# Patient Record
Sex: MALE | Race: WHITE | NOT HISPANIC OR LATINO | Employment: OTHER | URBAN - METROPOLITAN AREA
[De-identification: names, ages, dates, MRNs, and addresses within clinical notes are randomized per-mention and may not be internally consistent; named-entity substitution may affect disease eponyms.]

---

## 2022-09-26 ENCOUNTER — OFFICE VISIT (OUTPATIENT)
Dept: ORTHOPEDIC SURGERY | Facility: CLINIC | Age: 62
End: 2022-09-26

## 2022-09-26 VITALS — WEIGHT: 207.23 LBS | HEIGHT: 65 IN | OXYGEN SATURATION: 98 % | HEART RATE: 66 BPM | BODY MASS INDEX: 34.53 KG/M2

## 2022-09-26 DIAGNOSIS — S92.354A CLOSED NONDISPLACED FRACTURE OF FIFTH METATARSAL BONE OF RIGHT FOOT, INITIAL ENCOUNTER: Primary | ICD-10-CM

## 2022-09-26 PROCEDURE — 99203 OFFICE O/P NEW LOW 30 MIN: CPT | Performed by: STUDENT IN AN ORGANIZED HEALTH CARE EDUCATION/TRAINING PROGRAM

## 2022-09-26 RX ORDER — DEXLANSOPRAZOLE 60 MG/1
60 CAPSULE, DELAYED RELEASE ORAL DAILY
COMMUNITY
End: 2023-03-28 | Stop reason: SDUPTHER

## 2022-09-26 RX ORDER — INSULIN ASPART 100 [IU]/ML
INJECTION, SUSPENSION SUBCUTANEOUS 2 TIMES DAILY WITH MEALS
COMMUNITY
End: 2023-03-28

## 2022-09-26 RX ORDER — ATORVASTATIN CALCIUM 20 MG/1
20 TABLET, FILM COATED ORAL DAILY
COMMUNITY
End: 2023-03-28 | Stop reason: SDUPTHER

## 2022-09-26 NOTE — PROGRESS NOTES
"Chief Complaint  Pain and Follow-up of the Right Foot    Subjective          Said Gris presents to [facility] for   History of Present Illness    The patient presents here today for evaluation of the right foot. The patient reports he injured his right foot on 9/19/22. He is here with his daughter. He had x-rays and was placed into a splint. He has no other complaints. His splint was removed today.   No Known Allergies     Social History     Socioeconomic History   • Marital status:    Tobacco Use   • Smoking status: Never Smoker   • Smokeless tobacco: Never Used        I reviewed the patient's chief complaint, history of present illness, review of systems, past medical history, surgical history, family history, social history, medications, and allergy list.     REVIEW OF SYSTEMS    Constitutional: Denies fevers, chills, weight loss  Cardiovascular: Denies chest pain, shortness of breath  Skin: Denies rashes, acute skin changes  Neurologic: Denies headache, loss of consciousness  MSK: Right foot pain      Objective   Vital Signs:   Pulse 66   Ht 165.1 cm (65\")   Wt 94 kg (207 lb 3.7 oz)   SpO2 98%   BMI 34.49 kg/m²     Body mass index is 34.49 kg/m².    Physical Exam    General: Alert. No acute distress.   Right foot- Moderate swelling. No wounds. Mild tenderness to anterior lateral ligaments. Tender to 5th metatarsal base. Non-tender elsewhere. Intact achilles. Calf soft. Neurovascularly intact. Positive EHL, FHL, GS and TA. Sensation intact to all 5 nerves of the foot. Positive pulses. Intact ankle plantar flexion and dorsiflexion     Procedures    Imaging Results (Most Recent)     None                   Assessment and Plan        No results found.     Diagnoses and all orders for this visit:    1. Closed nondisplaced fracture of fifth metatarsal bone of right foot, initial encounter (Primary)        Discussed the treatment plan with the patient.  The patient was placed " into a short CAM walker boot today. He can remove for hygiene. He can bear weight through the heel. I advised him to ice and elevate for swelling.       Will obtain X-Rays of Right foot at next visit.     Call or return if worsening symptoms.    Scribed for Christiano Martin MD by Vicki Alfaro  09/26/2022   14:12 EDT         Follow Up   Return in about 3 weeks (around 10/17/2022).  Patient was given instructions and counseling regarding his condition or for health maintenance advice. Please see specific information pulled into the AVS if appropriate.       I have personally performed the services described in this document as scribed by the above individual and it is both accurate and complete.     Christiano Martin MD  09/26/22  14:16 EDT

## 2022-10-19 ENCOUNTER — OFFICE VISIT (OUTPATIENT)
Dept: ORTHOPEDIC SURGERY | Facility: CLINIC | Age: 62
End: 2022-10-19

## 2022-10-19 VITALS — HEIGHT: 65 IN | WEIGHT: 207 LBS | BODY MASS INDEX: 34.49 KG/M2

## 2022-10-19 DIAGNOSIS — S92.354D CLOSED NONDISPLACED FRACTURE OF FIFTH METATARSAL BONE OF RIGHT FOOT WITH ROUTINE HEALING, SUBSEQUENT ENCOUNTER: Primary | ICD-10-CM

## 2022-10-19 PROCEDURE — 99213 OFFICE O/P EST LOW 20 MIN: CPT | Performed by: PHYSICIAN ASSISTANT

## 2022-10-19 NOTE — PROGRESS NOTES
"Chief Complaint  Follow-up of the Right Foot    Subjective          Said Gris presents to [facility] for   History of Present Illness    Said Gris presents today for a follow-up of his right foot.  Patient has a right fifth metatarsal fracture with initial injury 9/19/2022.  Today, patient states he is doing well.  He reports he is doing better than his previous appointment.  He reports no pain.  He experiences swelling at night sometimes.  He has remained in his fracture boot and reports minimal ambulation.  Denies new injuries.  Denies numbness or tingling.      No Known Allergies     Social History     Socioeconomic History   • Marital status:    Tobacco Use   • Smoking status: Never   • Smokeless tobacco: Never        I reviewed the patient's chief complaint, history of present illness, review of systems, past medical history, surgical history, family history, social history, medications, and allergy list.     REVIEW OF SYSTEMS    Constitutional: Denies fevers, chills, weight loss  Cardiovascular: Denies chest pain, shortness of breath  Skin: Denies rashes, acute skin changes  Neurologic: Denies headache, loss of consciousness  MSK: Right foot pain      Objective   Vital Signs:   Ht 165.1 cm (65\")   Wt 93.9 kg (207 lb)   BMI 34.45 kg/m²     Body mass index is 34.45 kg/m².    Physical Exam    General: Alert. No acute distress.   Right lower extremity: Calf soft, nontender.  Achilles intact.  No pain with syndesmotic squeeze test.  Nontender to the medial or lateral malleolus.  Nontender to the fifth metatarsal.  Nontender to the remaining foot.  Sensation intact to the dorsal and plantar foot.  Demonstrates active ankle plantarflexion and dorsiflexion.  Toe range of motion intact.  No swelling.  Palpable pedal pulses.    Procedures    Imaging Results (Most Recent)     Procedure Component Value Units Date/Time    XR Foot 3+ View Right [[id number]] Resulted: 10/19/22 1119 "     Updated: 10/19/22 1120    Narrative:      Indications: Follow-up right fifth metatarsal fracture    Views: AP, oblique, lateral right foot    Findings: Right fifth base metatarsal fracture is seen.  Fracture is well   aligned.  Joints appear anatomically aligned.  No additional fractures.    Comparative Data: No comparative data available.                   Assessment and Plan    Diagnoses and all orders for this visit:    1. Closed nondisplaced fracture of fifth metatarsal bone of right foot with routine healing, subsequent encounter (Primary)  -     XR Foot 3+ View Right        Sowmya Landry presents today for follow-up of his right fifth metatarsal fracture with initial injury 9/19/2022.  X-rays reviewed with the patient and family member today.  Patient instructed to continue with fracture boot for ambulation.  We discussed weightbearing as tolerated while in the fracture boot, bear weight through the heel.  Patient and family member expressed understanding.  Remove the boot for hygiene and to work on gentle range of motion exercises.  These exercises were demonstrated in the office today.  Use ice and elevation for inflammation as needed.  Patient will follow up in 3 weeks for reevaluation.  We will obtain x-rays of the right foot at next visit.    Call or return if symptoms worsen or patient has any concerns.   Will obtain X-Rays of right foot at next visit.         Follow Up   Return in about 3 weeks (around 11/9/2022).  Patient was given instructions and counseling regarding his condition or for health maintenance advice. Please see specific information pulled into the AVS if appropriate.     Maryellen Dowling PA-C  10/19/22  11:20 EDT

## 2022-11-08 NOTE — PROGRESS NOTES
"Chief Complaint  Follow-up of the Right Foot    Subjective          Said Gris presents to [facility]   History of Present Illness    Said Gris presents today for a follow-up of his right foot.  Patient has a right fifth metatarsal fracture with initial injury 9/18/2022 that we are treating conservatively.  Today, he states he is doing well.  He is to continue with his fracture boot the majority of time with ambulation.  Patient explains that he has ambulated without the fracture boot and does okay.  He reports no pain today.  He states that his swelling occurs on occasion but has improved.  He denies new injuries.  Denies numbness or tingling.      No Known Allergies     Social History     Socioeconomic History   • Marital status:    Tobacco Use   • Smoking status: Never   • Smokeless tobacco: Never        I reviewed the patient's chief complaint, history of present illness, review of systems, past medical history, surgical history, family history, social history, medications, and allergy list.     REVIEW OF SYSTEMS    Constitutional: Denies fevers, chills, weight loss  Cardiovascular: Denies chest pain, shortness of breath  Skin: Denies rashes, acute skin changes  Neurologic: Denies headache, loss of consciousness  MSK: Right foot pain      Objective   Vital Signs:   Ht 165.1 cm (65\")   Wt 93.9 kg (207 lb)   BMI 34.45 kg/m²     Body mass index is 34.45 kg/m².    Physical Exam    General: Alert. No acute distress.   Right lower extremity: Nontender to the fifth metatarsal region.  No other areas of tenderness to the foot.  Calf soft, nontender.  Achilles intact.  Nontender to the medial lateral malleolus.  Ankle stable ligamentous stress.  Demonstrates active ankle plantarflexion and dorsiflexion with no associated pain or stiffness.  Toe range of motion intact.  Sensation intact to the dorsal and plantar foot.  Palpable pedal pulses.    Procedures    Imaging Results (Most " Recent)     Procedure Component Value Units Date/Time    XR Foot 3+ View Right [[id number]] Resulted: 11/09/22 1420     Updated: 11/09/22 1421    Narrative:      Indications: Follow-up left fifth metatarsal fracture    Views: AP, oblique, lateral right foot    Findings: Right fifth metatarsal base fracture is seen.  Fracture   alignment is stable.  Slight increase in callus formation about the   fracture line.  All joints are well aligned.  No additional fractures.    Comparative Data: Comparative data found and reviewed today.                   Assessment and Plan    Diagnoses and all orders for this visit:    1. Closed nondisplaced fracture of fifth metatarsal bone of right foot with routine healing, subsequent encounter (Primary)  -     XR Foot 3+ View Right        Sowmya Landry presents today for follow-up of his right fifth metatarsal fracture that we are treating nonoperatively.  X-rays reviewed with the patient and family member today.  Patient was given a [place] shoe.  He is instructed to transition to the hard sole shoe as tolerated.  Continue ambulating with a hard soled shoe at all times.  Patient instructed to continue working on ankle and toe range of motion exercises.  Use ice and elevation as needed for inflammation.      Patient will follow up in 4 weeks for reevaluation.  We will obtain new x-rays of the right foot at next visit.      Call or return if symptoms worsen or patient has any concerns.       Follow Up   Return in about 4 weeks (around 12/7/2022).  Patient was given instructions and counseling regarding his condition or for health maintenance advice. Please see specific information pulled into the AVS if appropriate.     Maryellen Dowling PA-C  11/09/22  14:23 EST

## 2022-11-09 ENCOUNTER — OFFICE VISIT (OUTPATIENT)
Dept: ORTHOPEDIC SURGERY | Facility: CLINIC | Age: 62
End: 2022-11-09

## 2022-11-09 VITALS — HEIGHT: 65 IN | BODY MASS INDEX: 34.49 KG/M2 | WEIGHT: 207 LBS

## 2022-11-09 DIAGNOSIS — S92.354D CLOSED NONDISPLACED FRACTURE OF FIFTH METATARSAL BONE OF RIGHT FOOT WITH ROUTINE HEALING, SUBSEQUENT ENCOUNTER: Primary | ICD-10-CM

## 2022-11-09 PROCEDURE — 99213 OFFICE O/P EST LOW 20 MIN: CPT | Performed by: PHYSICIAN ASSISTANT

## 2022-12-14 ENCOUNTER — OFFICE VISIT (OUTPATIENT)
Dept: ORTHOPEDIC SURGERY | Facility: CLINIC | Age: 62
End: 2022-12-14

## 2022-12-14 VITALS — BODY MASS INDEX: 34.49 KG/M2 | HEIGHT: 65 IN | WEIGHT: 207 LBS

## 2022-12-14 DIAGNOSIS — S92.354D CLOSED NONDISPLACED FRACTURE OF FIFTH METATARSAL BONE OF RIGHT FOOT WITH ROUTINE HEALING, SUBSEQUENT ENCOUNTER: Primary | ICD-10-CM

## 2022-12-14 DIAGNOSIS — M79.671 RIGHT FOOT PAIN: ICD-10-CM

## 2022-12-14 PROCEDURE — 99213 OFFICE O/P EST LOW 20 MIN: CPT | Performed by: PHYSICIAN ASSISTANT

## 2022-12-14 RX ORDER — BISOPROLOL FUMARATE 5 MG/1
TABLET, FILM COATED ORAL
COMMUNITY
Start: 2022-11-08 | End: 2023-03-28 | Stop reason: SDUPTHER

## 2022-12-14 RX ORDER — SODIUM BICARBONATE 650 MG/1
TABLET ORAL
COMMUNITY
Start: 2022-11-07

## 2022-12-14 RX ORDER — INSULIN ASPART 100 [IU]/ML
INJECTION, SOLUTION INTRAVENOUS; SUBCUTANEOUS
COMMUNITY
Start: 2022-11-07 | End: 2023-03-28 | Stop reason: SDUPTHER

## 2022-12-14 RX ORDER — AMLODIPINE AND VALSARTAN 10; 160 MG/1; MG/1
TABLET ORAL
COMMUNITY
Start: 2022-11-07 | End: 2023-03-28 | Stop reason: SDUPTHER

## 2022-12-14 RX ORDER — ALLOPURINOL 300 MG/1
TABLET ORAL
COMMUNITY
Start: 2022-11-07 | End: 2023-03-28 | Stop reason: SDUPTHER

## 2022-12-14 NOTE — PROGRESS NOTES
"Chief Complaint  Follow-up of the Right Foot    Subjective          Said Gris presents to [facility]   History of Present Illness    Said Gris presents today for a follow-up of his right foot.  Patient is a right fifth metatarsal fracture with initial injury making 2022 that we have been treating conservatively.  Today, he states that he is doing well.  He reports no pain to his foot.  He has continued ambulating with a hard soled shoe at times with no concerns.  He states that his swelling resolved a few days ago.  Denies new injuries.  Denies numbness or tingling.      No Known Allergies     Social History     Socioeconomic History   • Marital status:    Tobacco Use   • Smoking status: Never   • Smokeless tobacco: Never        I reviewed the patient's chief complaint, history of present illness, review of systems, past medical history, surgical history, family history, social history, medications, and allergy list.     REVIEW OF SYSTEMS    Constitutional: Denies fevers, chills, weight loss  Cardiovascular: Denies chest pain, shortness of breath  Skin: Denies rashes, acute skin changes  Neurologic: Denies headache, loss of consciousness  MSK: Right foot pain      Objective   Vital Signs:   Ht 165.1 cm (65\")   Wt 93.9 kg (207 lb)   BMI 34.45 kg/m²     Body mass index is 34.45 kg/m².    Physical Exam    General: Alert. No acute distress.   Right lower extremity: Calf soft, nontender.  Achilles intact.  Nontender to the medial or lateral malleolus.  Nontender to lateral foot.  No areas of tenderness to the foot.  Demonstrates active ankle plantarflexion and dorsiflexion with no associated pain or stiffness.  Toe range of motion intact.  No pain with inversion or eversion testing.  Sensation intact over the dorsal and plantar foot.  Palpable pedal pulses.    Procedures    Imaging Results (Most Recent)     Procedure Component Value Units Date/Time    XR Foot 3+ View Right " [[id number]] Resulted: 12/14/22 1259     Updated: 12/14/22 1259    Narrative:      Indications: Follow-up left fifth metatarsal base fracture    Views: AP, oblique, lateral left foot    Findings: Fracture to the base of the left fifth metatarsal is seen with   stable alignment.  Increased callus formation about the fracture site.    All joints are anatomically aligned.  No additional fractures.    Comparative Data: Comparative data found and reviewed today.                   Assessment and Plan    Diagnoses and all orders for this visit:    1. Closed nondisplaced fracture of fifth metatarsal bone of right foot with routine healing, subsequent encounter (Primary)    2. Right foot pain  -     XR Foot 3+ View Right        Sowmya Landry presents today for follow-up of his right fifth metatarsal fracture that we have been treating conservatively.  X-rays reviewed with the patient and daughter today.  Okay for patient to transition to a regular shoe as tolerated.  Continue with home exercises.  Use ice and elevation as needed.  We discussed following up for reevaluation but patient elected to follow-up as needed.      Call or return if symptoms worsen or patient has any concerns.       Follow Up   Return if symptoms worsen or fail to improve.  Patient was given instructions and counseling regarding his condition or for health maintenance advice. Please see specific information pulled into the AVS if appropriate.     Maryellen Dowling PA-C  12/14/22  16:27 EST

## 2023-03-28 ENCOUNTER — OFFICE VISIT (OUTPATIENT)
Dept: FAMILY MEDICINE CLINIC | Facility: CLINIC | Age: 63
End: 2023-03-28
Payer: COMMERCIAL

## 2023-03-28 ENCOUNTER — LAB (OUTPATIENT)
Dept: LAB | Facility: HOSPITAL | Age: 63
End: 2023-03-28
Payer: COMMERCIAL

## 2023-03-28 VITALS
HEART RATE: 64 BPM | DIASTOLIC BLOOD PRESSURE: 63 MMHG | OXYGEN SATURATION: 95 % | BODY MASS INDEX: 37.89 KG/M2 | HEIGHT: 65 IN | SYSTOLIC BLOOD PRESSURE: 142 MMHG | WEIGHT: 227.4 LBS

## 2023-03-28 DIAGNOSIS — Z11.59 ENCOUNTER FOR HEPATITIS C SCREENING TEST FOR LOW RISK PATIENT: ICD-10-CM

## 2023-03-28 DIAGNOSIS — Z87.448 HISTORY OF CHRONIC KIDNEY DISEASE: ICD-10-CM

## 2023-03-28 DIAGNOSIS — Z13.220 ENCOUNTER FOR LIPID SCREENING FOR CARDIOVASCULAR DISEASE: ICD-10-CM

## 2023-03-28 DIAGNOSIS — Z01.89 ROUTINE LAB DRAW: ICD-10-CM

## 2023-03-28 DIAGNOSIS — Z13.6 ENCOUNTER FOR LIPID SCREENING FOR CARDIOVASCULAR DISEASE: ICD-10-CM

## 2023-03-28 DIAGNOSIS — Z13.29 SCREENING FOR THYROID DISORDER: ICD-10-CM

## 2023-03-28 DIAGNOSIS — H61.23 IMPACTED CERUMEN OF BOTH EARS: ICD-10-CM

## 2023-03-28 DIAGNOSIS — D50.9 IRON DEFICIENCY ANEMIA, UNSPECIFIED IRON DEFICIENCY ANEMIA TYPE: ICD-10-CM

## 2023-03-28 DIAGNOSIS — H66.002 NON-RECURRENT ACUTE SUPPURATIVE OTITIS MEDIA OF LEFT EAR WITHOUT SPONTANEOUS RUPTURE OF TYMPANIC MEMBRANE: ICD-10-CM

## 2023-03-28 DIAGNOSIS — I10 HYPERTENSION, UNSPECIFIED TYPE: Primary | ICD-10-CM

## 2023-03-28 LAB
ALBUMIN SERPL-MCNC: 4 G/DL (ref 3.5–5.2)
ALBUMIN/GLOB SERPL: 1.3 G/DL
ALP SERPL-CCNC: 82 U/L (ref 39–117)
ALT SERPL W P-5'-P-CCNC: 14 U/L (ref 1–41)
ANION GAP SERPL CALCULATED.3IONS-SCNC: 12 MMOL/L (ref 5–15)
AST SERPL-CCNC: 16 U/L (ref 1–40)
BASOPHILS # BLD AUTO: 0.15 10*3/MM3 (ref 0–0.2)
BASOPHILS NFR BLD AUTO: 1.4 % (ref 0–1.5)
BILIRUB SERPL-MCNC: 0.2 MG/DL (ref 0–1.2)
BUN SERPL-MCNC: 37 MG/DL (ref 8–23)
BUN/CREAT SERPL: 17.7 (ref 7–25)
CALCIUM SPEC-SCNC: 9 MG/DL (ref 8.6–10.5)
CHLORIDE SERPL-SCNC: 104 MMOL/L (ref 98–107)
CHOLEST SERPL-MCNC: 167 MG/DL (ref 0–200)
CO2 SERPL-SCNC: 21 MMOL/L (ref 22–29)
CREAT SERPL-MCNC: 2.09 MG/DL (ref 0.76–1.27)
DEPRECATED RDW RBC AUTO: 44.6 FL (ref 37–54)
EGFRCR SERPLBLD CKD-EPI 2021: 34.7 ML/MIN/1.73
EOSINOPHIL # BLD AUTO: 1.17 10*3/MM3 (ref 0–0.4)
EOSINOPHIL NFR BLD AUTO: 10.7 % (ref 0.3–6.2)
ERYTHROCYTE [DISTWIDTH] IN BLOOD BY AUTOMATED COUNT: 14.4 % (ref 12.3–15.4)
GLOBULIN UR ELPH-MCNC: 3.2 GM/DL
GLUCOSE SERPL-MCNC: 179 MG/DL (ref 65–99)
HBA1C MFR BLD: 8.8 % (ref 4.8–5.6)
HCT VFR BLD AUTO: 37.2 % (ref 37.5–51)
HCV AB SER DONR QL: NORMAL
HDLC SERPL-MCNC: 44 MG/DL (ref 40–60)
HGB BLD-MCNC: 12.3 G/DL (ref 13–17.7)
LDLC SERPL CALC-MCNC: 102 MG/DL (ref 0–100)
LDLC/HDLC SERPL: 2.26 {RATIO}
LYMPHOCYTES # BLD AUTO: 2.99 10*3/MM3 (ref 0.7–3.1)
LYMPHOCYTES NFR BLD AUTO: 27.3 % (ref 19.6–45.3)
MCH RBC QN AUTO: 28.1 PG (ref 26.6–33)
MCHC RBC AUTO-ENTMCNC: 33.1 G/DL (ref 31.5–35.7)
MCV RBC AUTO: 85.1 FL (ref 79–97)
MONOCYTES # BLD AUTO: 0.86 10*3/MM3 (ref 0.1–0.9)
MONOCYTES NFR BLD AUTO: 7.9 % (ref 5–12)
NEUTROPHILS NFR BLD AUTO: 5.67 10*3/MM3 (ref 1.7–7)
NEUTROPHILS NFR BLD AUTO: 51.7 % (ref 42.7–76)
PLATELET # BLD AUTO: 199 10*3/MM3 (ref 140–450)
PMV BLD AUTO: 12.9 FL (ref 6–12)
POTASSIUM SERPL-SCNC: 4.6 MMOL/L (ref 3.5–5.2)
PROT SERPL-MCNC: 7.2 G/DL (ref 6–8.5)
RBC # BLD AUTO: 4.37 10*6/MM3 (ref 4.14–5.8)
SODIUM SERPL-SCNC: 137 MMOL/L (ref 136–145)
TRIGL SERPL-MCNC: 118 MG/DL (ref 0–150)
TSH SERPL DL<=0.05 MIU/L-ACNC: 3.38 UIU/ML (ref 0.27–4.2)
VLDLC SERPL-MCNC: 21 MG/DL (ref 5–40)
WBC NRBC COR # BLD: 10.95 10*3/MM3 (ref 3.4–10.8)

## 2023-03-28 PROCEDURE — 84443 ASSAY THYROID STIM HORMONE: CPT

## 2023-03-28 PROCEDURE — 83540 ASSAY OF IRON: CPT

## 2023-03-28 PROCEDURE — 83036 HEMOGLOBIN GLYCOSYLATED A1C: CPT

## 2023-03-28 PROCEDURE — 84466 ASSAY OF TRANSFERRIN: CPT

## 2023-03-28 PROCEDURE — 85025 COMPLETE CBC W/AUTO DIFF WBC: CPT

## 2023-03-28 PROCEDURE — 80061 LIPID PANEL: CPT

## 2023-03-28 PROCEDURE — 36415 COLL VENOUS BLD VENIPUNCTURE: CPT

## 2023-03-28 PROCEDURE — 80053 COMPREHEN METABOLIC PANEL: CPT

## 2023-03-28 PROCEDURE — 86803 HEPATITIS C AB TEST: CPT

## 2023-03-28 RX ORDER — INSULIN ASPART 100 [IU]/ML
INJECTION, SOLUTION INTRAVENOUS; SUBCUTANEOUS
Qty: 12 ML | Refills: 5 | Status: SHIPPED | OUTPATIENT
Start: 2023-03-28

## 2023-03-28 RX ORDER — DEXLANSOPRAZOLE 60 MG/1
60 CAPSULE, DELAYED RELEASE ORAL DAILY
Qty: 30 CAPSULE | Refills: 5 | Status: SHIPPED | OUTPATIENT
Start: 2023-03-28 | End: 2023-04-27

## 2023-03-28 RX ORDER — AMOXICILLIN AND CLAVULANATE POTASSIUM 875; 125 MG/1; MG/1
1 TABLET, FILM COATED ORAL 2 TIMES DAILY
Qty: 14 TABLET | Refills: 0 | Status: SHIPPED | OUTPATIENT
Start: 2023-03-28 | End: 2023-04-04

## 2023-03-28 RX ORDER — INSULIN ASPART 100 [IU]/ML
INJECTION, SOLUTION INTRAVENOUS; SUBCUTANEOUS
Qty: 10 ML | Refills: 5 | Status: SHIPPED | OUTPATIENT
Start: 2023-03-28 | End: 2023-03-28

## 2023-03-28 RX ORDER — INSULIN ASPART 100 [IU]/ML
INJECTION, SUSPENSION SUBCUTANEOUS 2 TIMES DAILY WITH MEALS
Status: CANCELLED | OUTPATIENT
Start: 2023-03-28

## 2023-03-28 RX ORDER — ALLOPURINOL 300 MG/1
300 TABLET ORAL DAILY
Qty: 30 TABLET | Refills: 5 | Status: SHIPPED | OUTPATIENT
Start: 2023-03-28 | End: 2023-04-27

## 2023-03-28 RX ORDER — BISOPROLOL FUMARATE 5 MG/1
TABLET, FILM COATED ORAL
Qty: 30 TABLET | Refills: 5 | Status: SHIPPED | OUTPATIENT
Start: 2023-03-28

## 2023-03-28 RX ORDER — AMLODIPINE AND VALSARTAN 10; 160 MG/1; MG/1
TABLET ORAL
Qty: 90 TABLET | Refills: 1 | Status: SHIPPED | OUTPATIENT
Start: 2023-03-28

## 2023-03-28 RX ORDER — ATORVASTATIN CALCIUM 20 MG/1
20 TABLET, FILM COATED ORAL DAILY
Qty: 90 TABLET | Refills: 1 | Status: SHIPPED | OUTPATIENT
Start: 2023-03-28

## 2023-03-28 NOTE — PROGRESS NOTES
Chief Complaint  Hypertension, Hypothyroidism, Hyperlipidemia, Establish Care (Nephrology referral ), and Diabetes    SUBJECTIVE  Sowmya Scott presents to [facility]    History of Present Illness  62-year-old Sowmya Scott presents today to establish care.  Patient presents today with his daughter Jana Landry      Patient has a history of chronic kidney disease.  He states that he was seeing nephrology in [facility] and needs to be established with a nephrologist here.      Patient has a history of hypertension.  Today's blood pressure is 142/63.  Patient's daughter states that they have been fasting for Ramadan and did not sleep much last night.  He is currently on amlodipine-valsartan 10- 160 mg tablets daily, Bisoprolol 5 mg tablets daily.  He states that he has not taken his medicine yet at this time.    Patient has a history of diabetes mellitus.  Daughter states that he is on NovoLog FlexPen 20 units in the a.m. and 15 units in p.m, and Tradjenta 5 mg tablets.  We will recheck with labs.  Have discussed with patient that fasting may not be good for him.     Patient has a history of hyperlipidemia and is currently doing well on Lipitor 20 mg tablets.  We will recheck with labs.      Patient also presents today with complaints of left-sided ear pain that has been present for several months.  States that he was given an antibiotic eardrop however he still has discharge from that ear.  Bilateral ears were impacted and ear wax removal procedure was completed during this visit.     Past Medical History:   Diagnosis Date   • Diabetes mellitus (HCC)    • GERD (gastroesophageal reflux disease)    • Hyperlipidemia    • Hypertension    • Hyperthyroidism    • Kidney stone       History reviewed. No pertinent family history.   Past Surgical History:   Procedure Laterality Date   • INNER EAR SURGERY Left    • KIDNEY STONE SURGERY          Current Outpatient Medications:   •  allopurinol  "(ZYLOPRIM) 300 MG tablet, Take 1 tablet by mouth Daily for 30 days., Disp: 30 tablet, Rfl: 5  •  amLODIPine-valsartan (EXFORGE)  MG per tablet, Take one tablet by mouth daily, Disp: 90 tablet, Rfl: 1  •  atorvastatin (LIPITOR) 20 MG tablet, Take 1 tablet by mouth Daily., Disp: 90 tablet, Rfl: 1  •  bisoprolol (ZEBeta) 5 MG tablet, Take one tablet by mouth daily., Disp: 30 tablet, Rfl: 5  •  dexlansoprazole (DEXILANT) 60 MG capsule, Take 1 capsule by mouth Daily for 30 days., Disp: 30 capsule, Rfl: 5  •  linagliptin (TRADJENTA) 5 MG tablet tablet, Take 1 tablet by mouth Daily., Disp: 30 tablet, Rfl: 5  •  NovoLOG FlexPen 100 UNIT/ML solution pen-injector sc pen, Inject 20 units in the morning and 15 units in the evening., Disp: 12 mL, Rfl: 5  •  sodium bicarbonate 650 MG tablet, TAKE 1 TABLET BY MOUTH THREE TIMES A DAY FOR 90 DAYS, Disp: , Rfl:   •  amoxicillin-clavulanate (Augmentin) 875-125 MG per tablet, Take 1 tablet by mouth 2 (Two) Times a Day for 7 days., Disp: 14 tablet, Rfl: 0    OBJECTIVE  Vital Signs:   /63 (BP Location: Left arm)   Pulse 64   Ht 165.1 cm (65\")   Wt 103 kg (227 lb 6.4 oz)   SpO2 95%   BMI 37.84 kg/m²    Estimated body mass index is 37.84 kg/m² as calculated from the following:    Height as of this encounter: 165.1 cm (65\").    Weight as of this encounter: 103 kg (227 lb 6.4 oz).     Wt Readings from Last 3 Encounters:   03/28/23 103 kg (227 lb 6.4 oz)   12/14/22 93.9 kg (207 lb)   11/09/22 93.9 kg (207 lb)     BP Readings from Last 3 Encounters:   03/28/23 142/63       Physical Exam  Vitals and nursing note reviewed.   Constitutional:       Appearance: Normal appearance. He is obese.   HENT:      Head: Normocephalic and atraumatic.      Right Ear: Ear canal and external ear normal. There is impacted cerumen.      Left Ear: Ear canal and external ear normal. There is impacted cerumen.      Ears:      Comments: When earwax was removed patient did have pus that was inside the " left ear, the tympanic membrane was bulging and erythematous.  He did state that hearing was improved.  Patient had bilateral earwax removal.     Nose: Nose normal.      Mouth/Throat:      Mouth: Mucous membranes are moist.   Eyes:      Conjunctiva/sclera: Conjunctivae normal.      Pupils: Pupils are equal, round, and reactive to light.   Cardiovascular:      Rate and Rhythm: Normal rate and regular rhythm.      Pulses: Normal pulses.      Heart sounds: Normal heart sounds.   Pulmonary:      Effort: Pulmonary effort is normal.      Breath sounds: Normal breath sounds.   Abdominal:      General: Abdomen is flat.      Palpations: Abdomen is soft.   Musculoskeletal:         General: Normal range of motion.      Cervical back: Normal range of motion and neck supple.   Skin:     General: Skin is warm and dry.   Neurological:      General: No focal deficit present.      Mental Status: He is alert and oriented to person, place, and time. Mental status is at baseline.   Psychiatric:         Mood and Affect: Mood normal.         Behavior: Behavior normal.         Thought Content: Thought content normal.         Judgment: Judgment normal.         Patient Care Team:  Jayne Dawson APRN as PCP - General (Emergency Medicine)           ASSESSMENT & PLAN    Diagnoses and all orders for this visit:    1. Hypertension, unspecified type (Primary)  Comments:  Patient is currently stable on medicines as listed.  We will continue this dosage.    2. Non-recurrent acute suppurative otitis media of left ear without spontaneous rupture of tympanic membrane  Comments:  I have ordered patient Augmentin for 7 days.  Orders:  -     amoxicillin-clavulanate (Augmentin) 875-125 MG per tablet; Take 1 tablet by mouth 2 (Two) Times a Day for 7 days.  Dispense: 14 tablet; Refill: 0    3. History of chronic kidney disease  Comments:  Referred patient to nephrology.  Orders:  -     Ambulatory Referral to Nephrology    4. Impacted cerumen of both  ears  Comments:  Patient had earwax removal procedure done in office today.    5. Screening for thyroid disorder  -     TSH; Future    6. Encounter for lipid screening for cardiovascular disease  -     Lipid Panel; Future    7. Routine lab draw  -     Comprehensive Metabolic Panel; Future  -     CBC & Differential; Future  -     Hemoglobin A1c; Future    8. Encounter for hepatitis C screening test for low risk patient  -     Hepatitis C antibody; Future    Other orders  -     allopurinol (ZYLOPRIM) 300 MG tablet; Take 1 tablet by mouth Daily for 30 days.  Dispense: 30 tablet; Refill: 5  -     amLODIPine-valsartan (EXFORGE)  MG per tablet; Take one tablet by mouth daily  Dispense: 90 tablet; Refill: 1  -     atorvastatin (LIPITOR) 20 MG tablet; Take 1 tablet by mouth Daily.  Dispense: 90 tablet; Refill: 1  -     bisoprolol (ZEBeta) 5 MG tablet; Take one tablet by mouth daily.  Dispense: 30 tablet; Refill: 5  -     dexlansoprazole (DEXILANT) 60 MG capsule; Take 1 capsule by mouth Daily for 30 days.  Dispense: 30 capsule; Refill: 5  -     Discontinue: NovoLOG FlexPen 100 UNIT/ML solution pen-injector sc pen; Inject 20 units in the morning and 12 units in the evening.  Dispense: 10 mL; Refill: 5  -     linagliptin (TRADJENTA) 5 MG tablet tablet; Take 1 tablet by mouth Daily.  Dispense: 30 tablet; Refill: 5  -     NovoLOG FlexPen 100 UNIT/ML solution pen-injector sc pen; Inject 20 units in the morning and 15 units in the evening.  Dispense: 12 mL; Refill: 5         Tobacco Use: Low Risk    • Smoking Tobacco Use: Never   • Smokeless Tobacco Use: Never   • Passive Exposure: Never       Follow Up     Return in about 3 months (around 6/28/2023).      Patient was given instructions and counseling regarding his condition or for health maintenance advice. Please see specific information pulled into the AVS if appropriate.   I have reviewed information obtained and documented by others and I have confirmed the accuracy of  this documented note.    Jayne Dawson, APRN

## 2023-03-30 ENCOUNTER — TELEPHONE (OUTPATIENT)
Dept: URGENT CARE | Facility: CLINIC | Age: 63
End: 2023-03-30
Payer: COMMERCIAL

## 2023-03-30 DIAGNOSIS — D50.9 IRON DEFICIENCY ANEMIA, UNSPECIFIED IRON DEFICIENCY ANEMIA TYPE: Primary | ICD-10-CM

## 2023-03-30 LAB
IRON 24H UR-MRATE: 40 MCG/DL (ref 59–158)
IRON SATN MFR SERPL: 10 % (ref 20–50)
TIBC SERPL-MCNC: 408 MCG/DL (ref 298–536)
TRANSFERRIN SERPL-MCNC: 274 MG/DL (ref 200–360)

## 2023-03-30 NOTE — PROGRESS NOTES
Can we check if he was unable to tolerate metformin. His A1C and glucose are elevated. I see him on trajenta and then the novolog flexpen. GFR is low, BUN and creatinine are elevated. He has already been referred to nephro. I have added an iron profile as his hemoglobin and hematocrit were slightly decreased.WBC are slightly increase but this could have been due to the ear.

## 2023-03-30 NOTE — PROGRESS NOTES
Has he tried metformin before?  We need to get him on something else and is the safest thing for kidney.

## 2023-03-30 NOTE — TELEPHONE ENCOUNTER
Caller: LINDA POOLE    Relationship to patient: Emergency Contact    Best call back number: [phone]    Patient is needing: PHARMACY ADVISED CALLER THAT PATIENT NEEDS PRIOR AUTHORIZATIONS FOR THE FOLLOWING MEDICATIONS: LINAGLIPTIN (TRADJENTA) 5MG TABLET  NOVOLOG FLEXPEN 100 UNIT/ML SOLUTION PEN-INJECTOR SC PEN    FAX NUMBER FOR PRIOR AUTHORIZATION: [phone]     PROVIDER LINE NUMBER: [phone]

## 2023-03-31 ENCOUNTER — TELEPHONE (OUTPATIENT)
Dept: FAMILY MEDICINE CLINIC | Facility: CLINIC | Age: 63
End: 2023-03-31
Payer: COMMERCIAL

## 2023-03-31 ENCOUNTER — TELEPHONE (OUTPATIENT)
Dept: URGENT CARE | Facility: CLINIC | Age: 63
End: 2023-03-31
Payer: COMMERCIAL

## 2023-03-31 NOTE — TELEPHONE ENCOUNTER
Caller: LINDA POOLE    Relationship: Emergency Contact    Best call back number: [phone]    What is the best time to reach you: ANY     Who are you requesting to speak with (clinical staff, provider,  specific staff member): CLINICAL     What was the call regarding: PATIENTS DAUGHTER CALLED AND WANTED A CALL BACK FROM CLINICAL STAFF THAT SPOKE WITH HER FATHER EARLIER. SHE STATED HE DIDN'T QUITE UNDERSTAND WHAT THEY WERE TALKING ABOUT WITH HIS MEDICATION AND SHE WANTED SOME CLARIFICATION ON THAT. PLEASE ADVISE.     Do you require a callback: YES

## 2023-04-03 ENCOUNTER — PATIENT ROUNDING (BHMG ONLY) (OUTPATIENT)
Dept: FAMILY MEDICINE CLINIC | Facility: CLINIC | Age: 63
End: 2023-04-03
Payer: COMMERCIAL

## 2023-04-03 RX ORDER — METFORMIN HYDROCHLORIDE 500 MG/1
500 TABLET, EXTENDED RELEASE ORAL
Qty: 30 TABLET | Refills: 5 | Status: SHIPPED | OUTPATIENT
Start: 2023-04-03 | End: 2023-05-03

## 2023-04-10 ENCOUNTER — TELEPHONE (OUTPATIENT)
Dept: URGENT CARE | Facility: CLINIC | Age: 63
End: 2023-04-10
Payer: COMMERCIAL

## 2023-04-10 NOTE — TELEPHONE ENCOUNTER
Caller: LINDA POOLE    Relationship: Emergency Contact    Best call back number:    [phone]      What is the best time to reach you: ANY    Who are you requesting to speak with (clinical staff, provider,  specific staff member): CLINICAL STAFF    What was the call regarding: PATIENTS DAUGHTER CALLED STATING THAT SHE NEEDS A PRIOR AUTHORIZATION ON HER FATHERS:  ALOGLIPTIN  LEXPRO INSULIN 100UNITS    Do you require a callback: NO

## 2023-04-17 ENCOUNTER — TELEPHONE (OUTPATIENT)
Dept: URGENT CARE | Facility: CLINIC | Age: 63
End: 2023-04-17
Payer: COMMERCIAL

## 2023-04-17 RX ORDER — BISOPROLOL FUMARATE 5 MG/1
TABLET, FILM COATED ORAL
Qty: 30 TABLET | Refills: 5 | Status: SHIPPED | OUTPATIENT
Start: 2023-04-17

## 2023-04-17 RX ORDER — AMLODIPINE AND VALSARTAN 10; 160 MG/1; MG/1
TABLET ORAL
Qty: 90 TABLET | Refills: 1 | Status: SHIPPED | OUTPATIENT
Start: 2023-04-17

## 2023-04-17 NOTE — TELEPHONE ENCOUNTER
Caller: LINDA POOLE    Relationship: Emergency Contact    Best call back number: [phone]    What is the best time to reach you: ANY     Who are you requesting to speak with (clinical staff, provider,  specific staff member): CLINICAL     What was the call regarding: PATIENTS DAUGHTER CALLED STATING SHE HAD SPOKE WITH PATIENTS INSURANCE REGARDING HIS INSULIN PRIOR AUTHORIZATION. DAUGHTER STATED THE INSURANCE SAID IF YOU NANCY THE PA URGENT HE IS ABLE TO GET THE MEDICATION SOONER BECAUSE HE IS COMPLETELY OUT OF INSULIN. PLEASE ADVISE.     Do you require a callback: YES

## 2023-04-24 ENCOUNTER — TELEPHONE (OUTPATIENT)
Dept: URGENT CARE | Facility: CLINIC | Age: 63
End: 2023-04-24

## 2023-04-24 NOTE — TELEPHONE ENCOUNTER
Caller: Tyler, Said    Relationship: Self    Best call back number: [phone]    What medication are you requesting: EITHER LISPRO OR ASPART    What are your current symptoms:     How long have you been experiencing symptoms:     Have you had these symptoms before:    [x] Yes  [] No    Have you been treated for these symptoms before:   [x] Yes  [] No    If a prescription is needed, what is your preferred pharmacy and phone number: [facility] #[id number] - [place] - [address] [facility] [phone] [facility] [phone] FX     Additional notes: PATIENT STATES THE NOVALOG PA HAS BEEN DENIED. INSURANCE COMPANY IS REQUESTING YOU SEND EITHER LISPRO OR ASPART AND THEY WILL NOT DENY THESE.    PLEASE CALL AND ADVISE WHEN THESE HAVE BEEN SENT IN. PATIENT DOES NOT HAVE ANY INSULIN AFTER TODAY

## 2023-04-26 ENCOUNTER — TELEPHONE (OUTPATIENT)
Dept: FAMILY MEDICINE CLINIC | Facility: CLINIC | Age: 63
End: 2023-04-26
Payer: COMMERCIAL

## 2023-04-26 NOTE — TELEPHONE ENCOUNTER
PATIENT CALLED STATING HE DOES NOT HAVE HIS insulin aspart (novoLOG FLEXPEN) 100 UNIT/ML solution pen-injector   AND HE HAS BEEN OUT FOR ALMOST A MONTH. NEEDS A CALL BACK ASAP.

## 2023-04-26 NOTE — TELEPHONE ENCOUNTER
Spoke w pt daughter and started new PA for insulin apart. I told her that once we hear something we would let her know

## 2023-04-27 ENCOUNTER — PRIOR AUTHORIZATION (OUTPATIENT)
Dept: FAMILY MEDICINE CLINIC | Facility: CLINIC | Age: 63
End: 2023-04-27
Payer: COMMERCIAL

## 2023-04-27 ENCOUNTER — TELEPHONE (OUTPATIENT)
Dept: FAMILY MEDICINE CLINIC | Facility: CLINIC | Age: 63
End: 2023-04-27
Payer: COMMERCIAL

## 2023-04-27 NOTE — TELEPHONE ENCOUNTER
Marshall from [facility] called asking if we can send over another medication due to  the novolog not being covered under the patients insurance.    Please advise thank you    If need to call marshall back   Number is [phone]

## 2023-04-27 NOTE — TELEPHONE ENCOUNTER
Spoke w pt's daughter and let her know that a PA is not required for medication per request put in through [employer]

## 2023-04-27 NOTE — TELEPHONE ENCOUNTER
I spoke w pharmacy and they notified us that pt's insurance will not cover insulin aspart, and suggested that pt call their ins company and ask what they will cover

## 2023-04-28 ENCOUNTER — TELEPHONE (OUTPATIENT)
Dept: FAMILY MEDICINE CLINIC | Facility: CLINIC | Age: 63
End: 2023-04-28
Payer: COMMERCIAL

## 2023-04-28 PROBLEM — E11.65 TYPE 2 DIABETES MELLITUS WITH HYPERGLYCEMIA, WITH LONG-TERM CURRENT USE OF INSULIN: Status: ACTIVE | Noted: 2023-04-28

## 2023-04-28 PROBLEM — Z79.4 TYPE 2 DIABETES MELLITUS WITH HYPERGLYCEMIA, WITH LONG-TERM CURRENT USE OF INSULIN: Status: ACTIVE | Noted: 2023-04-28

## 2023-04-28 PROBLEM — Z87.448 HISTORY OF CHRONIC KIDNEY DISEASE: Status: ACTIVE | Noted: 2023-04-28

## 2023-04-28 PROBLEM — I10 HYPERTENSION: Status: ACTIVE | Noted: 2023-04-28

## 2023-04-28 NOTE — TELEPHONE ENCOUNTER
Called pt's daughter no answer and unable to leave message due to mailbox being full.   OK TO TELL PT THAT THE INSULIN ASPART HAS GONE THROUGH, BUT PHARM HAD TO ORDER IT AND IT WILL THEY WILL CALL WHEN THEY RECEIVE IT IN.

## 2023-06-26 PROBLEM — E66.812 CLASS 2 SEVERE OBESITY WITH SERIOUS COMORBIDITY AND BODY MASS INDEX (BMI) OF 37.0 TO 37.9 IN ADULT: Status: ACTIVE | Noted: 2023-06-26

## 2023-06-26 PROBLEM — E66.01 CLASS 2 SEVERE OBESITY WITH SERIOUS COMORBIDITY AND BODY MASS INDEX (BMI) OF 37.0 TO 37.9 IN ADULT: Status: ACTIVE | Noted: 2023-06-26

## 2023-07-24 RX ORDER — HYDROCHLOROTHIAZIDE 12.5 MG/1
12.5 TABLET ORAL DAILY
Qty: 90 TABLET | Refills: 1 | Status: SHIPPED | OUTPATIENT
Start: 2023-07-24 | End: 2024-01-20

## 2023-09-21 ENCOUNTER — LAB (OUTPATIENT)
Dept: LAB | Facility: HOSPITAL | Age: 63
End: 2023-09-21

## 2023-09-21 ENCOUNTER — TRANSCRIBE ORDERS (OUTPATIENT)
Dept: LAB | Facility: HOSPITAL | Age: 63
End: 2023-09-21
Payer: COMMERCIAL

## 2023-09-21 DIAGNOSIS — E11.22 TYPE 2 DIABETES MELLITUS WITH ESRD (END-STAGE RENAL DISEASE): ICD-10-CM

## 2023-09-21 DIAGNOSIS — N18.9 CHRONIC KIDNEY DISEASE, UNSPECIFIED CKD STAGE: ICD-10-CM

## 2023-09-21 DIAGNOSIS — N40.0 ENLARGED PROSTATE: ICD-10-CM

## 2023-09-21 DIAGNOSIS — N18.6 TYPE 2 DIABETES MELLITUS WITH ESRD (END-STAGE RENAL DISEASE): ICD-10-CM

## 2023-09-21 DIAGNOSIS — N18.9 CHRONIC KIDNEY DISEASE, UNSPECIFIED CKD STAGE: Primary | ICD-10-CM

## 2023-09-21 DIAGNOSIS — I12.9 HYPERTENSIVE NEPHROPATHY: ICD-10-CM

## 2023-09-21 LAB
ALBUMIN SERPL-MCNC: 4.2 G/DL (ref 3.5–5.2)
ANION GAP SERPL CALCULATED.3IONS-SCNC: 12.2 MMOL/L (ref 5–15)
BACTERIA UR QL AUTO: NORMAL /HPF
BASOPHILS # BLD AUTO: 0.08 10*3/MM3 (ref 0–0.2)
BASOPHILS NFR BLD AUTO: 0.9 % (ref 0–1.5)
BILIRUB UR QL STRIP: NEGATIVE
BUN SERPL-MCNC: 35 MG/DL (ref 8–23)
BUN/CREAT SERPL: 19.1 (ref 7–25)
CALCIUM SPEC-SCNC: 9.6 MG/DL (ref 8.6–10.5)
CHLORIDE SERPL-SCNC: 106 MMOL/L (ref 98–107)
CLARITY UR: CLEAR
CO2 SERPL-SCNC: 20.8 MMOL/L (ref 22–29)
COLOR UR: YELLOW
CREAT SERPL-MCNC: 1.83 MG/DL (ref 0.76–1.27)
CREAT UR-MCNC: 67.9 MG/DL
DEPRECATED RDW RBC AUTO: 44.6 FL (ref 37–54)
EGFRCR SERPLBLD CKD-EPI 2021: 40.7 ML/MIN/1.73
EOSINOPHIL # BLD AUTO: 0.39 10*3/MM3 (ref 0–0.4)
EOSINOPHIL NFR BLD AUTO: 4.2 % (ref 0.3–6.2)
ERYTHROCYTE [DISTWIDTH] IN BLOOD BY AUTOMATED COUNT: 14.2 % (ref 12.3–15.4)
GLUCOSE SERPL-MCNC: 196 MG/DL (ref 65–99)
GLUCOSE UR STRIP-MCNC: NEGATIVE MG/DL
HBA1C MFR BLD: 8.4 % (ref 4.8–5.6)
HCT VFR BLD AUTO: 37.5 % (ref 37.5–51)
HGB BLD-MCNC: 12.4 G/DL (ref 13–17.7)
HGB UR QL STRIP.AUTO: NEGATIVE
HYALINE CASTS UR QL AUTO: NORMAL /LPF
IMM GRANULOCYTES # BLD AUTO: 0.08 10*3/MM3 (ref 0–0.05)
IMM GRANULOCYTES NFR BLD AUTO: 0.9 % (ref 0–0.5)
KETONES UR QL STRIP: NEGATIVE
LEUKOCYTE ESTERASE UR QL STRIP.AUTO: NEGATIVE
LYMPHOCYTES # BLD AUTO: 2.47 10*3/MM3 (ref 0.7–3.1)
LYMPHOCYTES NFR BLD AUTO: 26.4 % (ref 19.6–45.3)
MCH RBC QN AUTO: 28.6 PG (ref 26.6–33)
MCHC RBC AUTO-ENTMCNC: 33.1 G/DL (ref 31.5–35.7)
MCV RBC AUTO: 86.4 FL (ref 79–97)
MONOCYTES # BLD AUTO: 0.6 10*3/MM3 (ref 0.1–0.9)
MONOCYTES NFR BLD AUTO: 6.4 % (ref 5–12)
NEUTROPHILS NFR BLD AUTO: 5.72 10*3/MM3 (ref 1.7–7)
NEUTROPHILS NFR BLD AUTO: 61.2 % (ref 42.7–76)
NITRITE UR QL STRIP: NEGATIVE
NRBC BLD AUTO-RTO: 0 /100 WBC (ref 0–0.2)
PH UR STRIP.AUTO: 6 [PH] (ref 5–8)
PHOSPHATE SERPL-MCNC: 3.6 MG/DL (ref 2.5–4.5)
PLATELET # BLD AUTO: 208 10*3/MM3 (ref 140–450)
PMV BLD AUTO: 12.5 FL (ref 6–12)
POTASSIUM SERPL-SCNC: 5 MMOL/L (ref 3.5–5.2)
PROT ?TM UR-MCNC: 120 MG/DL
PROT UR QL STRIP: ABNORMAL
PROT/CREAT UR: 1.77 MG/G{CREAT}
PTH-INTACT SERPL-MCNC: 118 PG/ML (ref 15–65)
RBC # BLD AUTO: 4.34 10*6/MM3 (ref 4.14–5.8)
RBC # UR STRIP: NORMAL /HPF
REF LAB TEST METHOD: NORMAL
SODIUM SERPL-SCNC: 139 MMOL/L (ref 136–145)
SP GR UR STRIP: 1.01 (ref 1–1.03)
SQUAMOUS #/AREA URNS HPF: NORMAL /HPF
UROBILINOGEN UR QL STRIP: ABNORMAL
WBC # UR STRIP: NORMAL /HPF
WBC NRBC COR # BLD: 9.34 10*3/MM3 (ref 3.4–10.8)

## 2023-09-21 PROCEDURE — 82570 ASSAY OF URINE CREATININE: CPT

## 2023-09-21 PROCEDURE — 81001 URINALYSIS AUTO W/SCOPE: CPT

## 2023-09-21 PROCEDURE — 85025 COMPLETE CBC W/AUTO DIFF WBC: CPT

## 2023-09-21 PROCEDURE — 83036 HEMOGLOBIN GLYCOSYLATED A1C: CPT

## 2023-09-21 PROCEDURE — 36415 COLL VENOUS BLD VENIPUNCTURE: CPT

## 2023-09-21 PROCEDURE — 80069 RENAL FUNCTION PANEL: CPT

## 2023-09-21 PROCEDURE — 83970 ASSAY OF PARATHORMONE: CPT

## 2023-09-21 PROCEDURE — 84156 ASSAY OF PROTEIN URINE: CPT

## 2023-09-28 ENCOUNTER — OFFICE VISIT (OUTPATIENT)
Dept: FAMILY MEDICINE CLINIC | Facility: CLINIC | Age: 63
End: 2023-09-28
Payer: COMMERCIAL

## 2023-09-28 VITALS
HEIGHT: 65 IN | TEMPERATURE: 98.3 F | SYSTOLIC BLOOD PRESSURE: 122 MMHG | OXYGEN SATURATION: 97 % | HEART RATE: 54 BPM | DIASTOLIC BLOOD PRESSURE: 64 MMHG | BODY MASS INDEX: 38.49 KG/M2 | WEIGHT: 231 LBS

## 2023-09-28 DIAGNOSIS — E11.65 TYPE 2 DIABETES MELLITUS WITH HYPERGLYCEMIA, WITH LONG-TERM CURRENT USE OF INSULIN: Primary | ICD-10-CM

## 2023-09-28 DIAGNOSIS — M1A.9XX0 CHRONIC GOUT WITHOUT TOPHUS, UNSPECIFIED CAUSE, UNSPECIFIED SITE: ICD-10-CM

## 2023-09-28 DIAGNOSIS — K21.9 GASTROESOPHAGEAL REFLUX DISEASE WITHOUT ESOPHAGITIS: ICD-10-CM

## 2023-09-28 DIAGNOSIS — E78.5 HYPERLIPIDEMIA, UNSPECIFIED HYPERLIPIDEMIA TYPE: ICD-10-CM

## 2023-09-28 DIAGNOSIS — Z79.4 TYPE 2 DIABETES MELLITUS WITH HYPERGLYCEMIA, WITH LONG-TERM CURRENT USE OF INSULIN: Primary | ICD-10-CM

## 2023-09-28 DIAGNOSIS — I10 HYPERTENSION, UNSPECIFIED TYPE: ICD-10-CM

## 2023-09-28 NOTE — PROGRESS NOTES
Chief Complaint  Follow-up (3 months), Diabetes, Hypertension, Hyperlipidemia, Heartburn, and Edema    Subjective      Said Tyler presents to [facility]  History of Present Illness    Diabetes Mellitus, type 2: Patient is taking Metformin, Novolog. Patient is compliant with medications.  Patient's last A1c is 8.4% on 9/21/23. Patient does not monitor blood sugar at home.  Patient denies extreme high and low blood sugars.  Patient denies any unhealing sores. Patient attempts to monitor carbohydrate/ sugar intake in diet.     Hypertension:  Patient is taking Amlodipine Valsartan, Bisoprolol.  Patient's Blood Pressure in clinic today is 122/64.  Patient does not monitor blood pressure at home.  Patient denies chest pain, shortness of air, headache, flushing, abnormal swelling in feet/ankles.    Hyperlipidemia:  Patient is taking Atovastatin.  Patient denies nocturnal leg cramps, myalgias.  Patient attempts to maintain a diet low in fat and carbohydrates.    Gastroesophageal Reflux:  Patient is taking Dexilant, with good control of symptoms.  Patient does not need over the counter medications for breakthrough symptoms.  Patient tries to avoid trigger foods, eat frequent small meals, not lie down within 2 hours of eating, avoids NSAIDS medications and alcohol.    Edema:  Patient is taking HCTZ.    Gout:   Patient is taking Allopurinol with good control of flares.    Patient is requesting influenza vaccination today.      Current Outpatient Medications   Medication Instructions    allopurinol (ZYLOPRIM) 300 mg, Oral, Daily    amLODIPine-valsartan (EXFORGE)  MG per tablet 1 tablet, Oral, Daily    atorvastatin (LIPITOR) 20 mg, Oral, Daily    bisoprolol (ZEBeta) 5 MG tablet Take one tablet by mouth daily.    dexlansoprazole (DEXILANT) 60 mg, Oral, Daily    hydroCHLOROthiazide (HYDRODIURIL) 12.5 mg, Oral, Daily    insulin aspart (NOVOLOG FLEXPEN) 20 Units, Subcutaneous, 2 Times Daily,  "Inject 25 units in the morning and 20 units in the evening.    metFORMIN ER (GLUCOPHAGE-XR) 500 mg, Oral, Daily With Breakfast    sodium bicarbonate 650 MG tablet TAKE 1 TABLET BY MOUTH THREE TIMES A DAY FOR 90 DAYS       The following portions of the patient's history were reviewed and updated as appropriate: allergies, current medications, past family history, past medical history, past social history, past surgical history, and problem list.    Objective   Vital Signs:   /64 (BP Location: Left arm, Patient Position: Sitting, Cuff Size: Large Adult)   Pulse 54   Temp 98.3 °F (36.8 °C) (Oral)   Ht 165.1 cm (65\")   Wt 105 kg (231 lb)   SpO2 97%   BMI 38.44 kg/m²     Wt Readings from Last 3 Encounters:   09/28/23 105 kg (231 lb)   07/11/23 105 kg (231 lb 3.2 oz)   06/26/23 103 kg (227 lb)     BP Readings from Last 3 Encounters:   09/28/23 122/64   07/11/23 122/58   06/26/23 123/56     Physical Exam     Result Review :  The following data was reviewed by: ANETTE Kelly on 09/28/2023:      Common labs          6/26/2023    14:58 6/28/2023    13:32 9/21/2023    12:05   Common Labs   Glucose  163  196    BUN  39  35    Creatinine  2.06  1.83    Sodium  138  139    Potassium  5.1  5.0    Chloride  105  106    Calcium  9.8  9.6    Total Protein  7.3     Albumin  4.3     3.7  4.2    WBC   9.34    Hemoglobin   12.4    Hematocrit   37.5    Platelets   208    Hemoglobin A1C 8.6   8.40    PSA  6.980     Uric Acid  5.1         Lab Results (last 72 hours)       ** No results found for the last 72 hours. **             No Images in the past 120 days found..    Lab Results   Component Value Date    BILIRUBINUR Negative 09/21/2023       Procedures        Assessment and Plan   Diagnoses and all orders for this visit:    1. Type 2 diabetes mellitus with hyperglycemia, with long-term current use of insulin (Primary)  Comments:  Patient will return in 3 months.  Levels are trending down.  He will remain on NovoLog " FlexPen, metformin at this time.  Overview:  Have restarted patient on his NovoLog FlexPen.    Orders:  -     Cancel: POC Glycosylated Hemoglobin (Hb A1C)    2. Hypertension, unspecified type  Comments:  Patient is stable on amlodipine-valsartan  mg tablets daily, Zebeta 5 mg, hydrochlorothiazide 12.5 mg daily.  Overview:  Patient is currently stable on medicines as listed.  We will continue this dosage.      3. Chronic gout without tophus, unspecified cause, unspecified site  Comments:  Patient is currently stable on allopurinol 300 mg daily.  Overview:  Patient is currently stable on allopurinol 300 mg daily.      4. Hyperlipidemia, unspecified hyperlipidemia type  Comments:  Patient is currently stable on atorvastatin 20 mg daily.  Changes at this time.    5. Gastroesophageal reflux disease without esophagitis  Comments:  Patient is currently stable on Dexilant.  Advised to avoid trigger foods, eat frequent small meals and not lie down within 2 hours of eating.    Other orders  -     Fluzone >6 Months ([id number])                  Medications Discontinued During This Encounter   Medication Reason    metFORMIN ER (GLUCOPHAGE-XR) 500 MG 24 hr tablet Duplicate order          Follow Up   No follow-ups on file.  Patient was given instructions and counseling regarding his condition or for health maintenance advice. Please see specific information pulled into the AVS if appropriate.       ANETTE Kelly  10/02/23  09:26 EDT

## 2023-09-29 ENCOUNTER — TRANSCRIBE ORDERS (OUTPATIENT)
Dept: ADMINISTRATIVE | Facility: HOSPITAL | Age: 63
End: 2023-09-29
Payer: COMMERCIAL

## 2023-09-29 DIAGNOSIS — N18.4 STAGE 4 CHRONIC KIDNEY DISEASE: Primary | ICD-10-CM

## 2023-10-02 PROBLEM — M1A.9XX0 CHRONIC GOUT WITHOUT TOPHUS: Status: ACTIVE | Noted: 2023-10-02

## 2023-10-05 DIAGNOSIS — Z79.4 TYPE 2 DIABETES MELLITUS WITH HYPERGLYCEMIA, WITH LONG-TERM CURRENT USE OF INSULIN: ICD-10-CM

## 2023-10-05 DIAGNOSIS — E11.65 TYPE 2 DIABETES MELLITUS WITH HYPERGLYCEMIA, WITH LONG-TERM CURRENT USE OF INSULIN: ICD-10-CM

## 2023-10-13 ENCOUNTER — HOSPITAL ENCOUNTER (OUTPATIENT)
Dept: ULTRASOUND IMAGING | Facility: HOSPITAL | Age: 63
Discharge: HOME | End: 2023-10-13
Admitting: INTERNAL MEDICINE
Payer: COMMERCIAL

## 2023-10-13 DIAGNOSIS — N18.4 STAGE 4 CHRONIC KIDNEY DISEASE: ICD-10-CM

## 2023-10-13 PROCEDURE — 76775 US EXAM ABDO BACK WALL LIM: CPT

## 2023-10-30 RX ORDER — ALLOPURINOL 300 MG/1
300 TABLET ORAL DAILY
Qty: 90 TABLET | Refills: 1 | Status: SHIPPED | OUTPATIENT
Start: 2023-10-30

## 2023-10-30 RX ORDER — HYDROCHLOROTHIAZIDE 12.5 MG/1
12.5 TABLET ORAL DAILY
Qty: 90 TABLET | Refills: 1 | Status: SHIPPED | OUTPATIENT
Start: 2023-10-30

## 2023-10-30 RX ORDER — METFORMIN HYDROCHLORIDE 500 MG/1
500 TABLET, EXTENDED RELEASE ORAL
Qty: 90 TABLET | Refills: 1 | Status: SHIPPED | OUTPATIENT
Start: 2023-10-30 | End: 2024-04-27

## 2023-10-30 RX ORDER — BISOPROLOL FUMARATE 5 MG/1
TABLET, FILM COATED ORAL
Qty: 90 TABLET | Refills: 1 | Status: SHIPPED | OUTPATIENT
Start: 2023-10-30

## 2023-11-28 ENCOUNTER — OFFICE VISIT (OUTPATIENT)
Dept: FAMILY MEDICINE CLINIC | Facility: CLINIC | Age: 63
End: 2023-11-28
Payer: COMMERCIAL

## 2023-11-28 VITALS
WEIGHT: 228 LBS | HEIGHT: 65 IN | DIASTOLIC BLOOD PRESSURE: 70 MMHG | OXYGEN SATURATION: 96 % | HEART RATE: 75 BPM | TEMPERATURE: 97 F | SYSTOLIC BLOOD PRESSURE: 140 MMHG | BODY MASS INDEX: 37.99 KG/M2

## 2023-11-28 DIAGNOSIS — E11.65 TYPE 2 DIABETES MELLITUS WITH HYPERGLYCEMIA, WITH LONG-TERM CURRENT USE OF INSULIN: ICD-10-CM

## 2023-11-28 DIAGNOSIS — Z79.4 TYPE 2 DIABETES MELLITUS WITH HYPERGLYCEMIA, WITH LONG-TERM CURRENT USE OF INSULIN: ICD-10-CM

## 2023-11-28 DIAGNOSIS — J06.9 URI WITH COUGH AND CONGESTION: Primary | ICD-10-CM

## 2023-11-28 PROCEDURE — 99213 OFFICE O/P EST LOW 20 MIN: CPT

## 2023-11-28 RX ORDER — BISOPROLOL FUMARATE 5 MG/1
TABLET, FILM COATED ORAL
Qty: 90 TABLET | Refills: 1 | Status: SHIPPED | OUTPATIENT
Start: 2023-11-28

## 2023-11-28 RX ORDER — CEFDINIR 300 MG/1
300 CAPSULE ORAL 2 TIMES DAILY
Qty: 14 CAPSULE | Refills: 0 | Status: SHIPPED | OUTPATIENT
Start: 2023-11-28 | End: 2023-12-05

## 2023-11-28 RX ORDER — HYDROCHLOROTHIAZIDE 12.5 MG/1
12.5 TABLET ORAL DAILY
Qty: 90 TABLET | Refills: 1 | Status: SHIPPED | OUTPATIENT
Start: 2023-11-28

## 2023-11-28 RX ORDER — AMLODIPINE AND VALSARTAN 10; 160 MG/1; MG/1
1 TABLET ORAL DAILY
Qty: 90 TABLET | Refills: 1 | Status: SHIPPED | OUTPATIENT
Start: 2023-11-28 | End: 2024-05-26

## 2023-11-28 RX ORDER — DEXTROMETHORPHAN HYDROBROMIDE AND PROMETHAZINE HYDROCHLORIDE 15; 6.25 MG/5ML; MG/5ML
5 SYRUP ORAL 4 TIMES DAILY PRN
Qty: 180 ML | Refills: 0 | Status: SHIPPED | OUTPATIENT
Start: 2023-11-28

## 2023-11-28 NOTE — PROGRESS NOTES
Chief Complaint  Chief Complaint   Patient presents with    Cough    Nasal Congestion       HPI:  Sowmya Scott presents to [facility]    Patient presents today with complaints of cough, headache, neck nasal congestion, sore throat that started approximately 3 days ago.  He does not want to be flu or COVID test at this time.    Procedures     Past History:  Medical History: has a past medical history of Diabetes mellitus, GERD (gastroesophageal reflux disease), Hyperlipidemia, Hypertension, Hyperthyroidism, and Kidney stone.   Surgical History: has a past surgical history that includes Kidney stone surgery and Inner ear surgery (Left).   Family History: family history includes Diabetes in his mother; Hyperlipidemia in his mother.   Social History: reports that he has never smoked. He has never been exposed to tobacco smoke. He has never used smokeless tobacco. He reports that he does not drink alcohol and does not use drugs.  Immunization History   Administered Date(s) Administered    COVID-19 (UNSPECIFIED) 06/13/2021, 07/13/2021, 01/17/2022    Fluzone (or Fluarix & Flulaval for VFC) >6mos 09/28/2023    Influenza, Unspecified 01/01/2023         Allergies: Patient has no known allergies.     Medications:  Current Outpatient Medications on File Prior to Visit   Medication Sig Dispense Refill    allopurinol (ZYLOPRIM) 300 MG tablet TAKE 1 TABLET BY MOUTH EVERY DAY 90 tablet 1    atorvastatin (LIPITOR) 20 MG tablet Take 1 tablet by mouth Daily. 90 tablet 1    dexlansoprazole (Dexilant) 60 MG capsule Take 1 capsule by mouth Daily for 180 days. 30 capsule 5    metFORMIN ER (GLUCOPHAGE-XR) 500 MG 24 hr tablet TAKE 1 TABLET BY MOUTH DAILY WITH BREAKFAST  DAYS. 90 tablet 1    sodium bicarbonate 650 MG tablet TAKE 1 TABLET BY MOUTH THREE TIMES A DAY FOR 90 DAYS      [DISCONTINUED] amLODIPine-valsartan (EXFORGE)  MG per tablet Take 1 tablet by mouth Daily for 180 days. 90 tablet 1  "   [DISCONTINUED] bisoprolol (ZEBeta) 5 MG tablet TAKE 1 TABLET BY MOUTH EVERY DAY 90 tablet 1    [DISCONTINUED] hydroCHLOROthiazide (HYDRODIURIL) 12.5 MG tablet TAKE 1 TABLET BY MOUTH EVERY DAY 90 tablet 1    [DISCONTINUED] insulin aspart (novoLOG FLEXPEN) 100 UNIT/ML solution pen-injector sc pen Inject 20 Units under the skin into the appropriate area as directed 2 (Two) Times a Day for 180 days. Inject 25 units in the morning and 20 units in the evening. 12 mL 0     No current facility-administered medications on file prior to visit.        Health Maintenance Due   Topic Date Due    Pneumococcal Vaccine 65+ (1 - PCV) Never done    TDAP/TD VACCINES (1 - Tdap) Never done    ZOSTER VACCINE (1 of 2) Never done    Hepatitis B (1 of 3 - Risk 3-dose series) Never done    ANNUAL PHYSICAL  Never done    COVID-19 Vaccine (4 - 2023-24 season) 09/01/2023       Vital Signs:   Vitals:    11/28/23 1536   BP: 140/70   Pulse: 75   Temp: 97 °F (36.1 °C)   SpO2: 96%   Weight: 103 kg (228 lb)   Height: 165.1 cm (65\")      Body mass index is 37.94 kg/m².     ROS:  Review of Systems       Physical Exam  Vitals and nursing note reviewed.   Constitutional:       Appearance: Normal appearance.   HENT:      Head: Normocephalic and atraumatic.   Eyes:      Conjunctiva/sclera: Conjunctivae normal.      Pupils: Pupils are equal, round, and reactive to light.   Cardiovascular:      Rate and Rhythm: Normal rate and regular rhythm.      Pulses: Normal pulses.      Heart sounds: Normal heart sounds.   Pulmonary:      Effort: Pulmonary effort is normal.      Breath sounds: Wheezing present.   Abdominal:      General: Abdomen is flat.      Palpations: Abdomen is soft.   Musculoskeletal:         General: Normal range of motion.      Cervical back: Normal range of motion and neck supple.   Skin:     General: Skin is warm and dry.   Neurological:      General: No focal deficit present.      Mental Status: He is alert and oriented to person, place, " and time. Mental status is at baseline.   Psychiatric:         Mood and Affect: Mood normal.         Behavior: Behavior normal.         Thought Content: Thought content normal.         Judgment: Judgment normal.          Result Review        Diagnoses and all orders for this visit:    1. URI with cough and congestion (Primary)  Comments:  Have started patient on Promethazine DM.  Have started patient on Omnicef.    2. Type 2 diabetes mellitus with hyperglycemia, with long-term current use of insulin  Comments:  Have refilled patient's NovoLog FlexPen.  Orders:  -     insulin aspart (novoLOG FLEXPEN) 100 UNIT/ML solution pen-injector sc pen; Inject 20 Units under the skin into the appropriate area as directed 2 (Two) Times a Day for 180 days. Inject 25 units in the morning and 20 units in the evening.  Dispense: 12 mL; Refill: 0    Other orders  -     promethazine-dextromethorphan (PROMETHAZINE-DM) 6.25-15 MG/5ML syrup; Take 5 mL by mouth 4 (Four) Times a Day As Needed for Cough.  Dispense: 180 mL; Refill: 0  -     cefdinir (OMNICEF) 300 MG capsule; Take 1 capsule by mouth 2 (Two) Times a Day for 7 days.  Dispense: 14 capsule; Refill: 0  -     amLODIPine-valsartan (EXFORGE)  MG per tablet; Take 1 tablet by mouth Daily for 180 days.  Dispense: 90 tablet; Refill: 1  -     bisoprolol (ZEBeta) 5 MG tablet; TAKE 1 TABLET BY MOUTH EVERY DAY  Dispense: 90 tablet; Refill: 1  -     hydroCHLOROthiazide (HYDRODIURIL) 12.5 MG tablet; Take 1 tablet by mouth Daily.  Dispense: 90 tablet; Refill: 1      Follow Up   No follow-ups on file.  Patient was given instructions and counseling regarding his condition or for health maintenance advice. Please see specific information pulled into the AVS if appropriate.       ANETTE Kelly

## 2023-12-30 DIAGNOSIS — E11.65 TYPE 2 DIABETES MELLITUS WITH HYPERGLYCEMIA, WITH LONG-TERM CURRENT USE OF INSULIN: ICD-10-CM

## 2023-12-30 DIAGNOSIS — Z79.4 TYPE 2 DIABETES MELLITUS WITH HYPERGLYCEMIA, WITH LONG-TERM CURRENT USE OF INSULIN: ICD-10-CM

## 2024-02-27 ENCOUNTER — OFFICE VISIT (OUTPATIENT)
Dept: FAMILY MEDICINE CLINIC | Facility: CLINIC | Age: 64
End: 2024-02-27
Payer: COMMERCIAL

## 2024-02-27 VITALS
SYSTOLIC BLOOD PRESSURE: 112 MMHG | WEIGHT: 230 LBS | OXYGEN SATURATION: 97 % | TEMPERATURE: 97.3 F | HEIGHT: 65 IN | HEART RATE: 57 BPM | DIASTOLIC BLOOD PRESSURE: 70 MMHG | BODY MASS INDEX: 38.32 KG/M2

## 2024-02-27 DIAGNOSIS — L03.90 CELLULITIS, UNSPECIFIED CELLULITIS SITE: Primary | ICD-10-CM

## 2024-02-27 PROCEDURE — 99213 OFFICE O/P EST LOW 20 MIN: CPT | Performed by: STUDENT IN AN ORGANIZED HEALTH CARE EDUCATION/TRAINING PROGRAM

## 2024-02-27 RX ORDER — CEPHALEXIN 250 MG/1
250 CAPSULE ORAL 4 TIMES DAILY
Qty: 28 CAPSULE | Refills: 0 | Status: SHIPPED | OUTPATIENT
Start: 2024-02-27 | End: 2024-03-05

## 2024-02-27 RX ORDER — TAMSULOSIN HYDROCHLORIDE 0.4 MG/1
CAPSULE ORAL
COMMUNITY
Start: 2023-12-30

## 2024-02-27 NOTE — PROGRESS NOTES
"Chief Complaint  Rash (Right leg)    Subjective      Rash        Said Tyler is a 63 y.o. male who presents to [facility] DM-2 presents  today for acute visit   Patient reports he has a infected lesion on the back of his right shin he states he scratched himself and later noticed that the lesion became more painful.  Of note he has uncontrolled diabetes mellitus type 2 currently on insulin his last A1c was 8.4.      Objective   Vital Signs:   Vitals:    02/27/24 1420   BP: 112/70   Pulse: 57   Temp: 97.3 °F (36.3 °C)   SpO2: 97%   Weight: 104 kg (230 lb)   Height: 165.1 cm (65\")     Body mass index is 38.27 kg/m².    Wt Readings from Last 3 Encounters:   02/27/24 104 kg (230 lb)   11/28/23 103 kg (228 lb)   09/28/23 105 kg (231 lb)     BP Readings from Last 3 Encounters:   02/27/24 112/70   11/28/23 140/70   09/28/23 122/64       Health Maintenance   Topic Date Due    Pneumococcal Vaccine 65+ (1 of 2 - PCV) Never done    TDAP/TD VACCINES (1 - Tdap) Never done    ZOSTER VACCINE (1 of 2) Never done    Hepatitis B (1 of 3 - Risk 3-dose series) Never done    RSV Vaccine - Adults (1 - 1-dose 60+ series) Never done    ANNUAL PHYSICAL  Never done    COVID-19 Vaccine (4 - 2023-24 season) 09/01/2023    DIABETIC EYE EXAM  06/26/2024 (Originally 9/26/2022)    HEMOGLOBIN A1C  03/21/2024    LIPID PANEL  03/28/2024    DIABETIC FOOT EXAM  06/26/2024    BMI FOLLOWUP  06/26/2024    URINE MICROALBUMIN  09/21/2024    COLORECTAL CANCER SCREENING  03/15/2026    HEPATITIS C SCREENING  Completed    INFLUENZA VACCINE  Completed       Physical Exam  Constitutional:       General: He is not in acute distress.     Appearance: Normal appearance. He is not toxic-appearing.   HENT:      Head: Normocephalic and atraumatic.      Right Ear: Tympanic membrane normal.      Left Ear: Tympanic membrane normal.      Nose: Nose normal.      Mouth/Throat:      Mouth: Mucous membranes are moist.   Eyes:      General: No " scleral icterus.     Extraocular Movements: Extraocular movements intact.      Conjunctiva/sclera: Conjunctivae normal.      Pupils: Pupils are equal, round, and reactive to light.   Cardiovascular:      Rate and Rhythm: Normal rate and regular rhythm.      Pulses: Normal pulses.      Heart sounds: Normal heart sounds. No murmur heard.     No gallop.   Pulmonary:      Effort: Pulmonary effort is normal. No respiratory distress.   Abdominal:      General: Abdomen is flat. Bowel sounds are normal. There is no distension.      Palpations: Abdomen is soft.   Musculoskeletal:         General: Normal range of motion.      Cervical back: Normal range of motion.   Skin:     General: Skin is warm and dry.      Capillary Refill: Capillary refill takes less than 2 seconds.   Neurological:      General: No focal deficit present.      Mental Status: He is alert.   Psychiatric:         Mood and Affect: Mood normal.        Small near lesion about 7 cm in size appears to be honey crusted, no oozing no induration noted  Result Review :     The following data was reviewed by: Sabrina Garcia MD on 02/27/2024:      Procedures          Diagnoses and all orders for this visit:    1. Cellulitis, unspecified cellulitis site (Primary)  -     cephalexin (Keflex) 250 MG capsule; Take 1 capsule by mouth 4 (Four) Times a Day for 7 days.  Dispense: 28 capsule; Refill: 0  -     mupirocin (BACTROBAN) 2 % ointment; Apply 1 Application topically to the appropriate area as directed 3 (Three) Times a Day.  Dispense: 30 g; Refill: 2    Will treat with Keflex to cover for impetigo, mupirocin discussed tighter glucose control to help with wound healing.              FOLLOW UP  Return in about 3 months (around 5/27/2024).  Patient was given instructions and counseling regarding his condition or for health maintenance advice. Please see specific information pulled into the AVS if appropriate.       Sabrina Garcia MD  02/27/24  14:52 EST    CURRENT &  DISCONTINUED MEDICATIONS  Current Outpatient Medications   Medication Instructions    allopurinol (ZYLOPRIM) 300 mg, Oral, Daily    amLODIPine-valsartan (EXFORGE)  MG per tablet 1 tablet, Oral, Daily    atorvastatin (LIPITOR) 20 mg, Oral, Daily    bisoprolol (ZEBeta) 5 MG tablet TAKE 1 TABLET BY MOUTH EVERY DAY    cephalexin (KEFLEX) 250 mg, Oral, 4 Times Daily    hydroCHLOROthiazide 12.5 mg, Oral, Daily    insulin aspart (NOVOLOG FLEXPEN) 20 Units, Subcutaneous, 2 Times Daily, Inject 25 units in the morning and 20 units in the evening.    metFORMIN ER (GLUCOPHAGE-XR) 500 mg, Oral, Daily With Breakfast    mupirocin (BACTROBAN) 2 % ointment 1 Application, Topical, 3 Times Daily    promethazine-dextromethorphan (PROMETHAZINE-DM) 6.25-15 MG/5ML syrup 5 mL, Oral, 4 Times Daily PRN    sodium bicarbonate 650 MG tablet TAKE 1 TABLET BY MOUTH THREE TIMES A DAY FOR 90 DAYS    tamsulosin (FLOMAX) 0.4 MG capsule 24 hr capsule TAKE 1 CAPSULE (0.4 MG TOTAL) BY MOUTH AT BEDTIME       There are no discontinued medications.

## 2024-03-05 ENCOUNTER — OFFICE VISIT (OUTPATIENT)
Dept: FAMILY MEDICINE CLINIC | Facility: CLINIC | Age: 64
End: 2024-03-05
Payer: COMMERCIAL

## 2024-03-05 ENCOUNTER — TRANSCRIBE ORDERS (OUTPATIENT)
Dept: ADMINISTRATIVE | Facility: HOSPITAL | Age: 64
End: 2024-03-05
Payer: COMMERCIAL

## 2024-03-05 ENCOUNTER — LAB (OUTPATIENT)
Dept: LAB | Facility: HOSPITAL | Age: 64
End: 2024-03-05
Payer: COMMERCIAL

## 2024-03-05 ENCOUNTER — HOSPITAL ENCOUNTER (OUTPATIENT)
Dept: GENERAL RADIOLOGY | Facility: HOSPITAL | Age: 64
Discharge: HOME | End: 2024-03-05
Payer: COMMERCIAL

## 2024-03-05 VITALS
HEIGHT: 65 IN | BODY MASS INDEX: 38.15 KG/M2 | WEIGHT: 229 LBS | OXYGEN SATURATION: 97 % | DIASTOLIC BLOOD PRESSURE: 59 MMHG | HEART RATE: 58 BPM | SYSTOLIC BLOOD PRESSURE: 153 MMHG

## 2024-03-05 DIAGNOSIS — N40.0 BENIGN PROSTATIC HYPERPLASIA WITHOUT LOWER URINARY TRACT SYMPTOMS: ICD-10-CM

## 2024-03-05 DIAGNOSIS — E11.65 TYPE 2 DIABETES MELLITUS WITH HYPERGLYCEMIA, WITH LONG-TERM CURRENT USE OF INSULIN: Primary | ICD-10-CM

## 2024-03-05 DIAGNOSIS — M10.9 GOUT, UNSPECIFIED CAUSE, UNSPECIFIED CHRONICITY, UNSPECIFIED SITE: ICD-10-CM

## 2024-03-05 DIAGNOSIS — K21.9 GASTROESOPHAGEAL REFLUX DISEASE, UNSPECIFIED WHETHER ESOPHAGITIS PRESENT: ICD-10-CM

## 2024-03-05 DIAGNOSIS — M54.31 SCIATICA, RIGHT SIDE: ICD-10-CM

## 2024-03-05 DIAGNOSIS — N18.4 CHRONIC KIDNEY DISEASE, STAGE IV (SEVERE): Primary | ICD-10-CM

## 2024-03-05 DIAGNOSIS — Z79.4 TYPE 2 DIABETES MELLITUS WITH HYPERGLYCEMIA, WITH LONG-TERM CURRENT USE OF INSULIN: Primary | ICD-10-CM

## 2024-03-05 DIAGNOSIS — Z79.4 TYPE 2 DIABETES MELLITUS WITH HYPERGLYCEMIA, WITH LONG-TERM CURRENT USE OF INSULIN: ICD-10-CM

## 2024-03-05 DIAGNOSIS — I12.9 HYPERTENSIVE CHRONIC KIDNEY DISEASE WITH STAGE 1 THROUGH STAGE 4 CHRONIC KIDNEY DISEASE, OR UNSPECIFIED CHRONIC KIDNEY DISEASE: ICD-10-CM

## 2024-03-05 DIAGNOSIS — E66.01 CLASS 2 SEVERE OBESITY WITH SERIOUS COMORBIDITY AND BODY MASS INDEX (BMI) OF 38.0 TO 38.9 IN ADULT, UNSPECIFIED OBESITY TYPE: ICD-10-CM

## 2024-03-05 DIAGNOSIS — E11.65 TYPE 2 DIABETES MELLITUS WITH HYPERGLYCEMIA, WITH LONG-TERM CURRENT USE OF INSULIN: ICD-10-CM

## 2024-03-05 DIAGNOSIS — R06.09 DYSPNEA ON EXERTION: ICD-10-CM

## 2024-03-05 DIAGNOSIS — D64.9 ANEMIA, UNSPECIFIED TYPE: ICD-10-CM

## 2024-03-05 DIAGNOSIS — I10 HYPERTENSION, UNSPECIFIED TYPE: ICD-10-CM

## 2024-03-05 DIAGNOSIS — E11.22 TYPE 2 DIABETES MELLITUS WITH DIABETIC CHRONIC KIDNEY DISEASE, UNSPECIFIED CKD STAGE, UNSPECIFIED WHETHER LONG TERM INSULIN USE: ICD-10-CM

## 2024-03-05 DIAGNOSIS — E78.5 HYPERLIPIDEMIA, UNSPECIFIED HYPERLIPIDEMIA TYPE: ICD-10-CM

## 2024-03-05 PROCEDURE — 84466 ASSAY OF TRANSFERRIN: CPT

## 2024-03-05 PROCEDURE — 80061 LIPID PANEL: CPT

## 2024-03-05 PROCEDURE — 36415 COLL VENOUS BLD VENIPUNCTURE: CPT

## 2024-03-05 PROCEDURE — 83036 HEMOGLOBIN GLYCOSYLATED A1C: CPT

## 2024-03-05 PROCEDURE — 83540 ASSAY OF IRON: CPT

## 2024-03-05 PROCEDURE — 82043 UR ALBUMIN QUANTITATIVE: CPT

## 2024-03-05 PROCEDURE — 84443 ASSAY THYROID STIM HORMONE: CPT

## 2024-03-05 PROCEDURE — 82570 ASSAY OF URINE CREATININE: CPT

## 2024-03-05 PROCEDURE — 82607 VITAMIN B-12: CPT

## 2024-03-05 PROCEDURE — 82728 ASSAY OF FERRITIN: CPT

## 2024-03-05 PROCEDURE — 71046 X-RAY EXAM CHEST 2 VIEWS: CPT

## 2024-03-05 PROCEDURE — 85025 COMPLETE CBC W/AUTO DIFF WBC: CPT

## 2024-03-05 PROCEDURE — 99214 OFFICE O/P EST MOD 30 MIN: CPT | Performed by: NURSE PRACTITIONER

## 2024-03-05 PROCEDURE — 80053 COMPREHEN METABOLIC PANEL: CPT

## 2024-03-05 RX ORDER — ALLOPURINOL 300 MG/1
300 TABLET ORAL DAILY
Qty: 90 TABLET | Refills: 1 | Status: SHIPPED | OUTPATIENT
Start: 2024-03-05

## 2024-03-05 RX ORDER — ATORVASTATIN CALCIUM 20 MG/1
20 TABLET, FILM COATED ORAL DAILY
Qty: 90 TABLET | Refills: 1 | Status: SHIPPED | OUTPATIENT
Start: 2024-03-05

## 2024-03-05 RX ORDER — DEXLANSOPRAZOLE 60 MG/1
60 CAPSULE, DELAYED RELEASE ORAL DAILY
COMMUNITY
End: 2024-03-05 | Stop reason: SDUPTHER

## 2024-03-05 RX ORDER — HYDROCHLOROTHIAZIDE 12.5 MG/1
TABLET ORAL
Qty: 90 TABLET | Refills: 1 | Status: SHIPPED | OUTPATIENT
Start: 2024-03-05

## 2024-03-05 RX ORDER — BISOPROLOL FUMARATE 5 MG/1
TABLET, FILM COATED ORAL
Qty: 90 TABLET | Refills: 1 | Status: SHIPPED | OUTPATIENT
Start: 2024-03-05

## 2024-03-05 RX ORDER — DEXLANSOPRAZOLE 60 MG/1
60 CAPSULE, DELAYED RELEASE ORAL DAILY
Qty: 90 CAPSULE | Refills: 1 | Status: SHIPPED | OUTPATIENT
Start: 2024-03-05 | End: 2024-03-06

## 2024-03-05 RX ORDER — METFORMIN HYDROCHLORIDE 500 MG/1
500 TABLET, EXTENDED RELEASE ORAL
Qty: 90 TABLET | Refills: 1 | Status: CANCELLED | OUTPATIENT
Start: 2024-03-05 | End: 2024-09-01

## 2024-03-05 RX ORDER — METFORMIN HYDROCHLORIDE 500 MG/1
1000 TABLET, EXTENDED RELEASE ORAL
Qty: 180 TABLET | Refills: 1 | Status: SHIPPED | OUTPATIENT
Start: 2024-03-05

## 2024-03-05 RX ORDER — AMLODIPINE AND VALSARTAN 10; 160 MG/1; MG/1
1 TABLET ORAL DAILY
Qty: 90 TABLET | Refills: 1 | Status: SHIPPED | OUTPATIENT
Start: 2024-03-05 | End: 2024-09-01

## 2024-03-05 NOTE — ASSESSMENT & PLAN NOTE
Patient's (Body mass index is 38.11 kg/m².) indicates that they are morbidly/severely obese (BMI > 40 or > 35 with obesity - related health condition) with health conditions that include hypertension, diabetes mellitus, and dyslipidemias . Weight is unchanged. BMI  is above average; BMI management plan is completed. We discussed portion control and increasing exercise.

## 2024-03-05 NOTE — ASSESSMENT & PLAN NOTE
We had a long discussion today about patient's diabetes, A1c most recently is 8.4%, discussed not at goal, discussed that I would like to get him to a higher dose of metformin if he is able to tolerate, patient reports he has never had any issues, we will increase metformin to 1000 mg total daily for now, if he is still tolerating this in a month and his A1c is not well-controlled we will go ahead and increase to 2000 mg daily, discussed that I will likely discontinue his short acting insulin,(NovoLog) but will wait to see what his A1c looks like prior to, discussed that I would like to potentially start him on something like Ozempic or Mounjaro, we did discuss this medication and its side effects at length, patient denies any personal history of pancreatitis or personal or family history of medullary thyroid cancer or multiple endocrine neoplasia, we will call and discuss any further changes once we review the labs from today, we will follow back up in office in 3 months, patient and daughter verbalized understanding

## 2024-03-05 NOTE — ASSESSMENT & PLAN NOTE
BP elevated well above goal at present, pt reports typically well controlled, was well-controlled at office visit last month with previous PCP, they will cont to monitor at home and will f/u if remaining elevated

## 2024-03-05 NOTE — PROGRESS NOTES
Chief Complaint  Establish Care, Hypertension, Hyperlipidemia, and Diabetes    SUBJECTIVE  Said Tyler presents to [facility] to to establish care with new provider, previous PCP was ANETTE Kelly.    Pt is due refills and labs.    Pt seeing nephrology Dr. Jayro Lopez       Pt states that for the last month he has been feeling more SOB with activity, states feels like he has to breath faster to catch his breath, does not have to be particularly stressful activity, and he still gets short of breath.  But has no chest pain, no palpitations, no history of heart issues.      Patient complains of right side low back pain, radiating down buttocks and leg, states is a burning sensation and aches,, does have an occasional numbness/tingling in the leg but infrequent, no weakness, no saddle anesthesia    History of Present Illness  Past Medical History:   Diagnosis Date    Diabetes mellitus     GERD (gastroesophageal reflux disease)     Hyperlipidemia     Hypertension     Hyperthyroidism     Kidney stone       Family History   Problem Relation Age of Onset    Diabetes Mother     Hyperlipidemia Mother       Past Surgical History:   Procedure Laterality Date    INNER EAR SURGERY Left     KIDNEY STONE SURGERY          Current Outpatient Medications:     allopurinol (ZYLOPRIM) 300 MG tablet, Take 1 tablet by mouth Daily., Disp: 90 tablet, Rfl: 1    amLODIPine-valsartan (EXFORGE)  MG per tablet, Take 1 tablet by mouth Daily for 180 days., Disp: 90 tablet, Rfl: 1    atorvastatin (LIPITOR) 20 MG tablet, Take 1 tablet by mouth Daily., Disp: 90 tablet, Rfl: 1    bisoprolol (ZEBeta) 5 MG tablet, TAKE 1 TABLET BY MOUTH EVERY DAY, Disp: 90 tablet, Rfl: 1    dexlansoprazole (DEXILANT) 60 MG capsule, Take 1 capsule by mouth Daily., Disp: 90 capsule, Rfl: 1    hydroCHLOROthiazide 12.5 MG tablet, 1 tab PO QD PRN, Disp: 90 tablet, Rfl: 1    insulin aspart (novoLOG FLEXPEN) 100 UNIT/ML  "solution pen-injector sc pen, Inject 20 Units under the skin into the appropriate area as directed 2 (Two) Times a Day for 180 days. Inject 25 units in the morning and 20 units in the evening., Disp: 12 mL, Rfl: 0    metFORMIN ER (GLUCOPHAGE-XR) 500 MG 24 hr tablet, Take 2 tablets by mouth Daily With Breakfast., Disp: 180 tablet, Rfl: 1    sodium bicarbonate 650 MG tablet, TAKE 1 TABLET BY MOUTH THREE TIMES A DAY FOR 90 DAYS, Disp: , Rfl:     tamsulosin (FLOMAX) 0.4 MG capsule 24 hr capsule, TAKE 1 CAPSULE (0.4 MG TOTAL) BY MOUTH AT BEDTIME, Disp: , Rfl:     ferrous gluconate (FERGON) 324 MG tablet, Take 1 tablet by mouth Daily With Breakfast., Disp: 90 tablet, Rfl: 1    Semaglutide,0.25 or 0.5MG/DOS, (OZEMPIC) 2 MG/1.5ML solution pen-injector, Inject 0.25 mg under the skin into the appropriate area as directed 1 (One) Time Per Week., Disp: 1.5 mL, Rfl: 1    OBJECTIVE  Vital Signs:   /59   Pulse 58   Ht 165.1 cm (65\")   Wt 104 kg (229 lb)   SpO2 97%   BMI 38.11 kg/m²    Estimated body mass index is 38.11 kg/m² as calculated from the following:    Height as of this encounter: 165.1 cm (65\").    Weight as of this encounter: 104 kg (229 lb).     Wt Readings from Last 3 Encounters:   03/05/24 104 kg (229 lb)   02/27/24 104 kg (230 lb)   11/28/23 103 kg (228 lb)     BP Readings from Last 3 Encounters:   03/05/24 153/59   02/27/24 112/70   11/28/23 140/70       Physical Exam  Vitals reviewed.   Constitutional:       Appearance: Normal appearance. He is well-developed.   HENT:      Head: Normocephalic and atraumatic.      Right Ear: External ear normal.      Left Ear: External ear normal.   Eyes:      Conjunctiva/sclera: Conjunctivae normal.      Pupils: Pupils are equal, round, and reactive to light.   Cardiovascular:      Rate and Rhythm: Normal rate and regular rhythm.      Heart sounds: No murmur heard.     No friction rub. No gallop.   Pulmonary:      Effort: Pulmonary effort is normal.      Breath sounds: " Normal breath sounds. No wheezing or rhonchi.   Musculoskeletal:      Lumbar back: No tenderness or bony tenderness. Positive right straight leg raise test. Negative left straight leg raise test.      Comments: Patient is noted to be fairly inflexible   Skin:     General: Skin is warm and dry.   Neurological:      Mental Status: He is alert and oriented to person, place, and time.      Cranial Nerves: No cranial nerve deficit.   Psychiatric:         Mood and Affect: Mood and affect normal.         Behavior: Behavior normal.         Thought Content: Thought content normal.         Judgment: Judgment normal.          Result Review    CMP          6/28/2023    13:32 9/21/2023    12:05 3/5/2024    12:11   CMP   Glucose 163  196  220    BUN 39  35  44    Creatinine 2.06  1.83  2.06    EGFR 35.3  40.7  35.1    Sodium 138  139  137    Potassium 5.1  5.0  5.1    Chloride 105  106  105    Calcium 9.8  9.6  10.0    Total Protein 7.3      Total Protein   7.4    Albumin 4.3     3.7  4.2  4.2    Globulin 3.6      Globulin   3.2    Total Bilirubin   0.3    Alkaline Phosphatase   79    AST (SGOT)   14    ALT (SGPT)   14    Albumin/Globulin Ratio   1.3    BUN/Creatinine Ratio 18.9  19.1  21.4    Anion Gap 12.1  12.2  13.4      CBC          3/28/2023    08:17 9/21/2023    12:05 3/5/2024    12:11   CBC   WBC 10.95  9.34  9.34    RBC 4.37  4.34  4.08    Hemoglobin 12.3  12.4  11.9    Hematocrit 37.2  37.5  36.0    MCV 85.1  86.4  88.2    MCH 28.1  28.6  29.2    MCHC 33.1  33.1  33.1    RDW 14.4  14.2  14.5    Platelets 199  208  206      Lipid Panel          3/28/2023    08:17 3/5/2024    12:11   Lipid Panel   Total Cholesterol 167  161    Triglycerides 118  175    HDL Cholesterol 44  46    VLDL Cholesterol 21  30    LDL Cholesterol  102  85    LDL/HDL Ratio 2.26  1.74      TSH          3/28/2023    08:17 3/5/2024    12:11   TSH   TSH 3.380  2.920      Most Recent A1C          3/5/2024    12:11   HGBA1C Most Recent   Hemoglobin A1C  9.00        A1C Last 3 Results          6/26/2023    14:58 9/21/2023    12:05 3/5/2024    12:11   HGBA1C Last 3 Results   Hemoglobin A1C 8.6  8.40  9.00               Lab Results   Component Value Date    JCEEQFSN25 565 03/05/2024     Lab Results   Component Value Date    RBCUA 0-2 09/21/2023    WBCUA 0-2 09/21/2023    BACTERIA None Seen 09/21/2023    SQUAMEPIUA 0-2 09/21/2023    HYALCASTU None Seen 09/21/2023    METHODOLOGY Automated Microscopy 09/21/2023    COLORU Yellow 09/21/2023    CLARITYU Clear 09/21/2023    PHUR 6.0 09/21/2023    SPECGRAVUR 1.015 09/21/2023    GLUCOSEU Negative 09/21/2023    KETONESU Negative 09/21/2023    BILIRUBINUR Negative 09/21/2023    BLOODU Negative 09/21/2023    PROTEINUA >=300 mg/dL (3+) (A) 09/21/2023    LEUKOCYTESUR Negative 09/21/2023    NITRITEU Negative 09/21/2023    UROBILINOGEN 0.2 E.U./dL 09/21/2023       No Images in the past 120 days found..     The above data has been reviewed by ANETTE Galvan 03/05/2024 10:33 EST.      ECG 12 Lead    Date/Time: 3/6/2024 2:44 PM  Performed by: Brandi Puente APRN    Authorized by: Brandi Puente APRN  Comparison: not compared with previous ECG   Previous ECG: no previous ECG available  Rhythm: sinus rhythm  QRS axis: left    Clinical impression: abnormal EKG  Comments: Machine reading: Sinus rhythm, atrial premature complex, left axis deviation           Patient Care Team:  Brandi Puente APRN as PCP - General (Nurse Practitioner)            ASSESSMENT & PLAN    Diagnoses and all orders for this visit:    1. Type 2 diabetes mellitus with hyperglycemia, with long-term current use of insulin (Primary)  Assessment & Plan:  We had a long discussion today about patient's diabetes, A1c most recently is 8.4%, discussed not at goal, discussed that I would like to get him to a higher dose of metformin if he is able to tolerate, patient reports he has never had any issues, we will increase metformin to 1000 mg total daily  for now, if he is still tolerating this in a month and his A1c is not well-controlled we will go ahead and increase to 2000 mg daily, discussed that I will likely discontinue his short acting insulin,(NovoLog) but will wait to see what his A1c looks like prior to, discussed that I would like to potentially start him on something like Ozempic or Mounjaro, we did discuss this medication and its side effects at length, patient denies any personal history of pancreatitis or personal or family history of medullary thyroid cancer or multiple endocrine neoplasia, we will call and discuss any further changes once we review the labs from today, we will follow back up in office in 3 months, patient and daughter verbalized understanding    Orders:  -     Comprehensive Metabolic Panel; Future  -     CBC & Differential; Future  -     Lipid Panel; Future  -     TSH Rfx On Abnormal To Free T4; Future  -     Hemoglobin A1c; Future  -     metFORMIN ER (GLUCOPHAGE-XR) 500 MG 24 hr tablet; Take 2 tablets by mouth Daily With Breakfast.  Dispense: 180 tablet; Refill: 1  -     Microalbumin / Creatinine Urine Ratio - Urine, Clean Catch; Future  -     Vitamin B12; Future    2. Gout, unspecified cause, unspecified chronicity, unspecified site  Overview:  Patient is currently stable on allopurinol 300 mg daily.    Orders:  -     allopurinol (ZYLOPRIM) 300 MG tablet; Take 1 tablet by mouth Daily.  Dispense: 90 tablet; Refill: 1    3. Hypertension, unspecified type  Assessment & Plan:  BP elevated well above goal at present, pt reports typically well controlled, was well-controlled at office visit last month with previous PCP, they will cont to monitor at home and will f/u if remaining elevated     Orders:  -     amLODIPine-valsartan (EXFORGE)  MG per tablet; Take 1 tablet by mouth Daily for 180 days.  Dispense: 90 tablet; Refill: 1  -     bisoprolol (ZEBeta) 5 MG tablet; TAKE 1 TABLET BY MOUTH EVERY DAY  Dispense: 90 tablet; Refill:  1  -     hydroCHLOROthiazide 12.5 MG tablet; 1 tab PO QD PRN  Dispense: 90 tablet; Refill: 1  -     Ambulatory Referral to Cardiology    4. Hyperlipidemia, unspecified hyperlipidemia type  Overview:  Stable on atorvastatin, continue current medication    Orders:  -     atorvastatin (LIPITOR) 20 MG tablet; Take 1 tablet by mouth Daily.  Dispense: 90 tablet; Refill: 1    5. Gastroesophageal reflux disease, unspecified whether esophagitis present  Overview:  Stable on Dexilant, continue current medication    Orders:  -     dexlansoprazole (DEXILANT) 60 MG capsule; Take 1 capsule by mouth Daily.  Dispense: 90 capsule; Refill: 1    6. Dyspnea on exertion  Comments:  Considering pts multiple risk factors including DM, HL, HTN, obesity, recommend referral to cardiology for further evaluation and probable stress test  Orders:  -     XR Chest 2 View; Future  -     Ambulatory Referral to Cardiology  -     ECG 12 Lead    7. Sciatica, right side  Comments:  Discussed need good body mechanics, increased flexibility, will refer to physical therapy for further evaluation and treatment, follow-up if no improvement  Orders:  -     Ambulatory Referral to Physical Therapy Evaluate and treat    8. Class 2 severe obesity with serious comorbidity and body mass index (BMI) of 38.0 to 38.9 in adult, unspecified obesity type  Assessment & Plan:  Patient's (Body mass index is 38.11 kg/m².) indicates that they are morbidly/severely obese (BMI > 40 or > 35 with obesity - related health condition) with health conditions that include hypertension, diabetes mellitus, and dyslipidemias . Weight is unchanged. BMI  is above average; BMI management plan is completed. We discussed portion control and increasing exercise.            Tobacco Use: Low Risk  (3/5/2024)    Patient History     Smoking Tobacco Use: Never     Smokeless Tobacco Use: Never     Passive Exposure: Never       Follow Up     Return in 3 months (on 6/5/2024), or if symptoms worsen or  fail to improve.        Patient was given instructions and counseling regarding his condition or for health maintenance advice. Please see specific information pulled into the AVS if appropriate.   I have reviewed information obtained and documented by others and I have confirmed the accuracy of this documented note.    Brandi Puente APRN

## 2024-03-06 ENCOUNTER — TRANSCRIBE ORDERS (OUTPATIENT)
Dept: LAB | Facility: HOSPITAL | Age: 64
End: 2024-03-06
Payer: COMMERCIAL

## 2024-03-06 ENCOUNTER — TELEPHONE (OUTPATIENT)
Dept: FAMILY MEDICINE CLINIC | Facility: CLINIC | Age: 64
End: 2024-03-06
Payer: COMMERCIAL

## 2024-03-06 ENCOUNTER — LAB (OUTPATIENT)
Dept: LAB | Facility: HOSPITAL | Age: 64
End: 2024-03-06
Payer: COMMERCIAL

## 2024-03-06 DIAGNOSIS — N40.0 ENLARGED PROSTATE: ICD-10-CM

## 2024-03-06 DIAGNOSIS — E11.22 TYPE 2 DIABETES MELLITUS WITH DIABETIC CHRONIC KIDNEY DISEASE, UNSPECIFIED CKD STAGE, UNSPECIFIED WHETHER LONG TERM INSULIN USE: ICD-10-CM

## 2024-03-06 DIAGNOSIS — N18.4 CHRONIC KIDNEY DISEASE, STAGE IV (SEVERE): ICD-10-CM

## 2024-03-06 DIAGNOSIS — N18.4 CHRONIC KIDNEY DISEASE, STAGE IV (SEVERE): Primary | ICD-10-CM

## 2024-03-06 DIAGNOSIS — D64.9 ANEMIA, UNSPECIFIED TYPE: Primary | ICD-10-CM

## 2024-03-06 DIAGNOSIS — I12.9 HYPERTENSIVE NEPHROPATHY: ICD-10-CM

## 2024-03-06 LAB
ALBUMIN SERPL-MCNC: 4.2 G/DL (ref 3.5–5.2)
ALBUMIN UR-MCNC: 102.9 MG/DL
ALBUMIN/GLOB SERPL: 1.3 G/DL
ALP SERPL-CCNC: 79 U/L (ref 39–117)
ALT SERPL W P-5'-P-CCNC: 14 U/L (ref 1–41)
ANION GAP SERPL CALCULATED.3IONS-SCNC: 13.4 MMOL/L (ref 5–15)
AST SERPL-CCNC: 14 U/L (ref 1–40)
BASOPHILS # BLD AUTO: 0.07 10*3/MM3 (ref 0–0.2)
BASOPHILS NFR BLD AUTO: 0.7 % (ref 0–1.5)
BILIRUB SERPL-MCNC: 0.3 MG/DL (ref 0–1.2)
BUN SERPL-MCNC: 44 MG/DL (ref 8–23)
BUN/CREAT SERPL: 21.4 (ref 7–25)
CALCIUM SPEC-SCNC: 10 MG/DL (ref 8.6–10.5)
CHLORIDE SERPL-SCNC: 105 MMOL/L (ref 98–107)
CHOLEST SERPL-MCNC: 161 MG/DL (ref 0–200)
CO2 SERPL-SCNC: 18.6 MMOL/L (ref 22–29)
CREAT SERPL-MCNC: 2.06 MG/DL (ref 0.76–1.27)
CREAT UR-MCNC: 63.9 MG/DL
CREAT UR-MCNC: 80.6 MG/DL
DEPRECATED RDW RBC AUTO: 45.9 FL (ref 37–54)
EGFRCR SERPLBLD CKD-EPI 2021: 35.1 ML/MIN/1.73
EOSINOPHIL # BLD AUTO: 0.41 10*3/MM3 (ref 0–0.4)
EOSINOPHIL NFR BLD AUTO: 4.4 % (ref 0.3–6.2)
ERYTHROCYTE [DISTWIDTH] IN BLOOD BY AUTOMATED COUNT: 14.5 % (ref 12.3–15.4)
FERRITIN SERPL-MCNC: 38.5 NG/ML (ref 30–400)
GLOBULIN UR ELPH-MCNC: 3.2 GM/DL
GLUCOSE SERPL-MCNC: 220 MG/DL (ref 65–99)
HBA1C MFR BLD: 9 % (ref 4.8–5.6)
HCT VFR BLD AUTO: 36 % (ref 37.5–51)
HDLC SERPL-MCNC: 46 MG/DL (ref 40–60)
HGB BLD-MCNC: 11.9 G/DL (ref 13–17.7)
IMM GRANULOCYTES # BLD AUTO: 0.08 10*3/MM3 (ref 0–0.05)
IMM GRANULOCYTES NFR BLD AUTO: 0.9 % (ref 0–0.5)
IRON 24H UR-MRATE: 49 MCG/DL (ref 59–158)
IRON SATN MFR SERPL: 13 % (ref 20–50)
LDLC SERPL CALC-MCNC: 85 MG/DL (ref 0–100)
LDLC/HDLC SERPL: 1.74 {RATIO}
LYMPHOCYTES # BLD AUTO: 2.59 10*3/MM3 (ref 0.7–3.1)
LYMPHOCYTES NFR BLD AUTO: 27.7 % (ref 19.6–45.3)
MCH RBC QN AUTO: 29.2 PG (ref 26.6–33)
MCHC RBC AUTO-ENTMCNC: 33.1 G/DL (ref 31.5–35.7)
MCV RBC AUTO: 88.2 FL (ref 79–97)
MICROALBUMIN/CREAT UR: 1610.3 MG/G (ref 0–29)
MONOCYTES # BLD AUTO: 0.59 10*3/MM3 (ref 0.1–0.9)
MONOCYTES NFR BLD AUTO: 6.3 % (ref 5–12)
NEUTROPHILS NFR BLD AUTO: 5.6 10*3/MM3 (ref 1.7–7)
NEUTROPHILS NFR BLD AUTO: 60 % (ref 42.7–76)
NRBC BLD AUTO-RTO: 0 /100 WBC (ref 0–0.2)
PLATELET # BLD AUTO: 206 10*3/MM3 (ref 140–450)
PMV BLD AUTO: 12 FL (ref 6–12)
POTASSIUM SERPL-SCNC: 5.1 MMOL/L (ref 3.5–5.2)
PROT ?TM UR-MCNC: 179.3 MG/DL
PROT SERPL-MCNC: 7.4 G/DL (ref 6–8.5)
PROT/CREAT UR: 2.22 MG/G{CREAT}
RBC # BLD AUTO: 4.08 10*6/MM3 (ref 4.14–5.8)
SODIUM SERPL-SCNC: 137 MMOL/L (ref 136–145)
TIBC SERPL-MCNC: 381 MCG/DL (ref 298–536)
TRANSFERRIN SERPL-MCNC: 256 MG/DL (ref 200–360)
TRIGL SERPL-MCNC: 175 MG/DL (ref 0–150)
TSH SERPL DL<=0.05 MIU/L-ACNC: 2.92 UIU/ML (ref 0.27–4.2)
VIT B12 BLD-MCNC: 565 PG/ML (ref 211–946)
VLDLC SERPL-MCNC: 30 MG/DL (ref 5–40)
WBC NRBC COR # BLD AUTO: 9.34 10*3/MM3 (ref 3.4–10.8)

## 2024-03-06 PROCEDURE — 84156 ASSAY OF PROTEIN URINE: CPT

## 2024-03-06 PROCEDURE — 80069 RENAL FUNCTION PANEL: CPT

## 2024-03-06 PROCEDURE — 93000 ELECTROCARDIOGRAM COMPLETE: CPT | Performed by: NURSE PRACTITIONER

## 2024-03-06 PROCEDURE — 81001 URINALYSIS AUTO W/SCOPE: CPT

## 2024-03-06 PROCEDURE — 82570 ASSAY OF URINE CREATININE: CPT

## 2024-03-06 RX ORDER — FERROUS GLUCONATE 324(38)MG
324 TABLET ORAL
Qty: 90 TABLET | Refills: 1 | Status: SHIPPED | OUTPATIENT
Start: 2024-03-06

## 2024-03-06 RX ORDER — OMEPRAZOLE 20 MG/1
20 CAPSULE, DELAYED RELEASE ORAL DAILY
Qty: 90 CAPSULE | Refills: 1 | Status: SHIPPED | OUTPATIENT
Start: 2024-03-06

## 2024-03-06 NOTE — TELEPHONE ENCOUNTER
Please notify patient that I have changed his prescription to omeprazole, let me know if he has any further problems

## 2024-03-06 NOTE — TELEPHONE ENCOUNTER
Pt went to pharmacy to  his medication Dexilant 60mg, the cost is $500, pharmacy told pt to reach out to PCP to see if something else could be sent in today to pharmacy at [facility]. Please advise and call pt when medication has been sent.

## 2024-03-07 LAB
ALBUMIN SERPL-MCNC: 4.1 G/DL (ref 3.5–5.2)
ANION GAP SERPL CALCULATED.3IONS-SCNC: 16.5 MMOL/L (ref 5–15)
BACTERIA UR QL AUTO: NORMAL /HPF
BILIRUB UR QL STRIP: NEGATIVE
BUN SERPL-MCNC: 45 MG/DL (ref 8–23)
BUN/CREAT SERPL: 23.2 (ref 7–25)
CALCIUM SPEC-SCNC: 9.4 MG/DL (ref 8.6–10.5)
CHLORIDE SERPL-SCNC: 102 MMOL/L (ref 98–107)
CLARITY UR: CLEAR
CO2 SERPL-SCNC: 17.5 MMOL/L (ref 22–29)
COLOR UR: YELLOW
CREAT SERPL-MCNC: 1.94 MG/DL (ref 0.76–1.27)
EGFRCR SERPLBLD CKD-EPI 2021: 37.7 ML/MIN/1.73
GLUCOSE SERPL-MCNC: 205 MG/DL (ref 65–99)
GLUCOSE UR STRIP-MCNC: NEGATIVE MG/DL
HGB UR QL STRIP.AUTO: NEGATIVE
HYALINE CASTS UR QL AUTO: NORMAL /LPF
KETONES UR QL STRIP: NEGATIVE
LEUKOCYTE ESTERASE UR QL STRIP.AUTO: NEGATIVE
NITRITE UR QL STRIP: NEGATIVE
PH UR STRIP.AUTO: 6 [PH] (ref 5–8)
PHOSPHATE SERPL-MCNC: 3.8 MG/DL (ref 2.5–4.5)
POTASSIUM SERPL-SCNC: 4.9 MMOL/L (ref 3.5–5.2)
PROT UR QL STRIP: ABNORMAL
RBC # UR STRIP: NORMAL /HPF
REF LAB TEST METHOD: NORMAL
SODIUM SERPL-SCNC: 136 MMOL/L (ref 136–145)
SP GR UR STRIP: 1.02 (ref 1–1.03)
SQUAMOUS #/AREA URNS HPF: NORMAL /HPF
UROBILINOGEN UR QL STRIP: ABNORMAL
WBC # UR STRIP: NORMAL /HPF

## 2024-03-07 NOTE — TELEPHONE ENCOUNTER
Approved. This drug has been approved. Approved quantity: 1.5 units per 56 day(s). The drug has been approved from 02/21/2024 to 03/06/2025.

## 2024-03-10 LAB
CYSTATIN C SERPL-MCNC: 1.99 MG/L (ref 0.72–1.16)
GFR/BSA.PRED SERPLBLD CYS-BASED-ARV: 31 ML/MIN/1.73

## 2024-03-14 ENCOUNTER — OFFICE VISIT (OUTPATIENT)
Dept: CARDIOLOGY | Facility: CLINIC | Age: 64
End: 2024-03-14
Payer: COMMERCIAL

## 2024-03-14 VITALS
BODY MASS INDEX: 37.75 KG/M2 | HEIGHT: 65 IN | SYSTOLIC BLOOD PRESSURE: 145 MMHG | WEIGHT: 226.6 LBS | DIASTOLIC BLOOD PRESSURE: 75 MMHG | HEART RATE: 58 BPM

## 2024-03-14 DIAGNOSIS — I25.810 CORONARY ARTERY DISEASE INVOLVING CORONARY BYPASS GRAFT OF NATIVE HEART WITHOUT ANGINA PECTORIS: ICD-10-CM

## 2024-03-14 DIAGNOSIS — R06.09 EXERTIONAL DYSPNEA: ICD-10-CM

## 2024-03-14 DIAGNOSIS — G47.33 OSA (OBSTRUCTIVE SLEEP APNEA): ICD-10-CM

## 2024-03-14 DIAGNOSIS — I51.89 DIASTOLIC DYSFUNCTION: ICD-10-CM

## 2024-03-14 DIAGNOSIS — I10 ESSENTIAL HYPERTENSION: Primary | ICD-10-CM

## 2024-03-14 RX ORDER — DEXLANSOPRAZOLE 60 MG/1
60 CAPSULE, DELAYED RELEASE ORAL DAILY
COMMUNITY

## 2024-03-14 NOTE — PROGRESS NOTES
CARDIOLOGY INITIAL CONSULT       Chief Complaint  Hypertension and Shortness of Breath (When lifting heavy )    Subjective            Said Tyler presents to [facility]  History of Present Illness  The patient have past medical history of essential hypertension, type 2 diabetes for over 15 years on insulin and oral medicines, coronary artery disease, stage IV chronic kidney disease.  He reports progressive exertional dyspnea.  He denies any chest discomfort or palpitations.  Last EKG shows sinus rhythm with left atrial enlargement repolarization abnormalities.  He had a previous cardiac evaluation when he was living in the [place] 5 years ago with abnormal stress test and cardiac catheterization which showed by report about 20% three-vessel disease. He reported leg swelling which is better after the use of low-dose diuretics.      Past History:    Medical History:  Past Medical History:   Diagnosis Date    Diabetes mellitus     GERD (gastroesophageal reflux disease)     Hyperlipidemia     Hypertension     Hyperthyroidism     Kidney stone        Surgical History: has a past surgical history that includes Kidney stone surgery and Inner ear surgery (Left).     Family History: family history includes Diabetes in his mother; Hyperlipidemia in his mother.     Social History: reports that he has never smoked. He has never been exposed to tobacco smoke. He has never used smokeless tobacco. He reports that he does not drink alcohol and does not use drugs.    Allergies: Patient has no known allergies.    Current Outpatient Medications on File Prior to Visit   Medication Sig    allopurinol (ZYLOPRIM) 300 MG tablet Take 1 tablet by mouth Daily.    amLODIPine-valsartan (EXFORGE)  MG per tablet Take 1 tablet by mouth Daily for 180 days.    atorvastatin (LIPITOR) 20 MG tablet Take 1 tablet by mouth Daily.    bisoprolol (ZEBeta) 5 MG tablet TAKE 1 TABLET BY MOUTH EVERY DAY    dexlansoprazole  "(DEXILANT) 60 MG capsule Take 1 capsule by mouth Daily.    ferrous gluconate (FERGON) 324 MG tablet Take 1 tablet by mouth Daily With Breakfast.    hydroCHLOROthiazide 12.5 MG tablet 1 tab PO QD PRN    insulin aspart (novoLOG FLEXPEN) 100 UNIT/ML solution pen-injector sc pen Inject 20 Units under the skin into the appropriate area as directed 2 (Two) Times a Day for 180 days. Inject 25 units in the morning and 20 units in the evening.    metFORMIN ER (GLUCOPHAGE-XR) 500 MG 24 hr tablet Take 2 tablets by mouth Daily With Breakfast.    omeprazole (priLOSEC) 20 MG capsule Take 1 capsule by mouth Daily.    Semaglutide,0.25 or 0.5MG/DOS, (OZEMPIC) 2 MG/1.5ML solution pen-injector Inject 0.25 mg under the skin into the appropriate area as directed 1 (One) Time Per Week.    sodium bicarbonate 650 MG tablet TAKE 1 TABLET BY MOUTH THREE TIMES A DAY FOR 90 DAYS    tamsulosin (FLOMAX) 0.4 MG capsule 24 hr capsule TAKE 1 CAPSULE (0.4 MG TOTAL) BY MOUTH AT BEDTIME     No current facility-administered medications on file prior to visit.          Review of Systems   All systems were reviewed and they were negative except for exertional dyspnea.  Objective     /75 (BP Location: Left arm)   Pulse 58   Ht 165.1 cm (65\")   Wt 103 kg (226 lb 9.6 oz)   BMI 37.71 kg/m²       Physical Exam  Neck. JVD 1 +, no bruits.   Heart. Regular, no gallops, no rubs, no murmurs.  Lung. Diminished BS.  Abd. Soft, bs+  LE minimal edema.  Neurologic. No motor deficits.      Result Review :     The following data was reviewed by: Kvng Mcpherson MD on 03/14/2024:    CMP          9/21/2023    12:05 3/5/2024    12:11 3/6/2024    11:15   CMP   Glucose 196  220  205    BUN 35  44  45    Creatinine 1.83  2.06  1.94    EGFR 40.7  35.1  37.7     31    Sodium 139  137  136    Potassium 5.0  5.1  4.9    Chloride 106  105  102    Calcium 9.6  10.0  9.4    Total Protein  7.4     Albumin 4.2  4.2  4.1    Globulin  3.2     Total Bilirubin  0.3     Alkaline " Phosphatase  79     AST (SGOT)  14     ALT (SGPT)  14     Albumin/Globulin Ratio  1.3     BUN/Creatinine Ratio 19.1  21.4  23.2    Anion Gap 12.2  13.4  16.5      CBC          3/28/2023    08:17 9/21/2023    12:05 3/5/2024    12:11   CBC   WBC 10.95  9.34  9.34    RBC 4.37  4.34  4.08    Hemoglobin 12.3  12.4  11.9    Hematocrit 37.2  37.5  36.0    MCV 85.1  86.4  88.2    MCH 28.1  28.6  29.2    MCHC 33.1  33.1  33.1    RDW 14.4  14.2  14.5    Platelets 199  208  206      TSH          3/28/2023    08:17 3/5/2024    12:11   TSH   TSH 3.380  2.920      Lipid Panel          3/28/2023    08:17 3/5/2024    12:11   Lipid Panel   Total Cholesterol 167  161    Triglycerides 118  175    HDL Cholesterol 44  46    VLDL Cholesterol 21  30    LDL Cholesterol  102  85    LDL/HDL Ratio 2.26  1.74         Data reviewed: Cardiology studies    Recent EKG: Sinus Rhythm, repolarization abnormalities.                  Assessment and Plan        Diagnoses and all orders for this visit:    1. Essential hypertension (Primary)    2. Diastolic dysfunction  -     Adult Transthoracic Echo Complete W/ Cont if Necessary Per Protocol; Future    3. Exertional dyspnea  -     Adult Transthoracic Echo Complete W/ Cont if Necessary Per Protocol; Future  -     Stress Test With Myocardial Perfusion One Day; Future    4. CLAUDINE (obstructive sleep apnea)  -     Ambulatory Referral to Sleep Lab    5. Coronary artery disease involving coronary bypass graft of native heart without angina pectoris  -     Stress Test With Myocardial Perfusion One Day; Future        The patient have exertional hypertension to and is treated on 4 different antihypertensive.  I will continue with the same medical regimen and advised the patient to keep a home blood pressure log in the morning and at bedtime to discuss at the next office visit.  The patient signs and symptoms of heart failure with preserved ejection fraction probably due to diastolic dysfunction.  Will proceed with a  2D echo to evaluate cardiac function and wall motion.  Will have a Lexiscan nuclear stress test to evaluate for progression of disease and underlying degree of ischemic burden.  After we review the blood pressure logs and response to medical therapy will consider workup for secondary causes of hypertension and might includ in the workup aldosterone to renin ratio and levels.  We also consider possible evaluation with noninvasive testing for renal artery stenosis.  The patient also have clinical features of obstructive sleep apnea and was referred for ambulatory sleep studies.  Advised to have a lifestyle modification with weight loss with a goal BMI below 30.  Advised to increase his physical activities with walking at least 45 minutes a day 5 times per week.  Will reevaluate after echo and stress test are completed.    I spent 35 minutes caring for Said on this date of service. This time includes time spent by me in the following activities:reviewing tests, obtaining and/or reviewing a separately obtained history, performing a medically appropriate examination and/or evaluation , ordering medications, tests, or procedures, and documenting information in the medical record    Follow Up     Return in about 6 weeks (around 4/25/2024).    Patient was given instructions and counseling regarding his condition or for health maintenance advice. Please see specific information pulled into the AVS if appropriate.

## 2024-03-15 ENCOUNTER — TELEPHONE (OUTPATIENT)
Dept: FAMILY MEDICINE CLINIC | Facility: CLINIC | Age: 64
End: 2024-03-15
Payer: COMMERCIAL

## 2024-03-15 NOTE — TELEPHONE ENCOUNTER
Incoming Refill Request      Medication requested (name and dose):(ACCU-CHEK) BLOOD SUGAR GLUCOSE  MONITOR AND BLOOD SUGAR TESTING STRIPS     Pharmacy where request should be sent: [facility]    Additional details provided by patient: CAN YOU CALL WHEN YOU SEND IT TO PHARMACY. PATIENT STATS HE NEVER HAD ONE.    Best call back number:[phone]    Does the patient have less than a 3 day supply:  [] Yes  [x] No    Karina De La Fuente  03/15/24, 13:49 EDT

## 2024-03-18 RX ORDER — BLOOD-GLUCOSE METER
KIT MISCELLANEOUS
Qty: 90 EACH | Refills: 1 | Status: SHIPPED | OUTPATIENT
Start: 2024-03-18

## 2024-04-06 DIAGNOSIS — E11.65 TYPE 2 DIABETES MELLITUS WITH HYPERGLYCEMIA, WITH LONG-TERM CURRENT USE OF INSULIN: ICD-10-CM

## 2024-04-06 DIAGNOSIS — Z79.4 TYPE 2 DIABETES MELLITUS WITH HYPERGLYCEMIA, WITH LONG-TERM CURRENT USE OF INSULIN: ICD-10-CM

## 2024-04-08 RX ORDER — INSULIN ASPART 100 [IU]/ML
INJECTION, SOLUTION INTRAVENOUS; SUBCUTANEOUS
OUTPATIENT
Start: 2024-04-08

## 2024-04-29 ENCOUNTER — HOSPITAL ENCOUNTER (OUTPATIENT)
Dept: CARDIOLOGY | Facility: HOSPITAL | Age: 64
Discharge: HOME | End: 2024-04-29
Admitting: INTERNAL MEDICINE
Payer: COMMERCIAL

## 2024-04-29 ENCOUNTER — HOSPITAL ENCOUNTER (OUTPATIENT)
Dept: NUCLEAR MEDICINE | Facility: HOSPITAL | Age: 64
Discharge: HOME | End: 2024-04-29
Payer: COMMERCIAL

## 2024-04-29 DIAGNOSIS — R06.09 EXERTIONAL DYSPNEA: ICD-10-CM

## 2024-04-29 DIAGNOSIS — I51.89 DIASTOLIC DYSFUNCTION: ICD-10-CM

## 2024-04-29 LAB
BH CV ECHO MEAS - AO ROOT DIAM: 3.3 CM
BH CV ECHO MEAS - EDV(CUBED): 168.7 ML
BH CV ECHO MEAS - EDV(MOD-SP2): 107 ML
BH CV ECHO MEAS - EDV(MOD-SP4): 98.5 ML
BH CV ECHO MEAS - EF(MOD-BP): 69.5 %
BH CV ECHO MEAS - EF(MOD-SP2): 66.9 %
BH CV ECHO MEAS - EF(MOD-SP4): 73.2 %
BH CV ECHO MEAS - ESV(CUBED): 32.3 ML
BH CV ECHO MEAS - ESV(MOD-SP2): 35.4 ML
BH CV ECHO MEAS - ESV(MOD-SP4): 26.4 ML
BH CV ECHO MEAS - FS: 42.4 %
BH CV ECHO MEAS - IVS/LVPW: 1.05 CM
BH CV ECHO MEAS - IVSD: 1.18 CM
BH CV ECHO MEAS - LA DIMENSION: 3.9 CM
BH CV ECHO MEAS - LAT PEAK E' VEL: 7.8 CM/SEC
BH CV ECHO MEAS - LV DIASTOLIC VOL/BSA (35-75): 50.9 CM2
BH CV ECHO MEAS - LV MASS(C)D: 259.7 GRAMS
BH CV ECHO MEAS - LV SYSTOLIC VOL/BSA (12-30): 13.6 CM2
BH CV ECHO MEAS - LVIDD: 5.5 CM
BH CV ECHO MEAS - LVIDS: 3.2 CM
BH CV ECHO MEAS - LVPWD: 1.12 CM
BH CV ECHO MEAS - MED PEAK E' VEL: 6 CM/SEC
BH CV ECHO MEAS - MV A MAX VEL: 63.8 CM/SEC
BH CV ECHO MEAS - MV DEC SLOPE: 197.3 CM/SEC2
BH CV ECHO MEAS - MV DEC TIME: 0.2 SEC
BH CV ECHO MEAS - MV E MAX VEL: 38.6 CM/SEC
BH CV ECHO MEAS - MV E/A: 0.6
BH CV ECHO MEAS - RVDD: 3 CM
BH CV ECHO MEAS - SV(MOD-SP2): 71.6 ML
BH CV ECHO MEAS - SV(MOD-SP4): 72.1 ML
BH CV ECHO MEAS - SVI(MOD-SP2): 37 ML/M2
BH CV ECHO MEAS - SVI(MOD-SP4): 37.3 ML/M2
BH CV ECHO MEASUREMENTS AVERAGE E/E' RATIO: 5.59
BH CV IMMEDIATE POST TECH DATA BLOOD PRESSURE: NORMAL MMHG
BH CV IMMEDIATE POST TECH DATA HEART RATE: 78 BPM
BH CV IMMEDIATE POST TECH DATA OXYGEN SATS: 98 %
BH CV REST NUCLEAR ISOTOPE DOSE: 9.4 MCI
BH CV SIX MINUTE RECOVERY TECH DATA BLOOD PRESSURE: NORMAL
BH CV SIX MINUTE RECOVERY TECH DATA HEART RATE: 75 BPM
BH CV SIX MINUTE RECOVERY TECH DATA OXYGEN SATURATION: 96 %
BH CV STRESS BP STAGE 1: NORMAL
BH CV STRESS COMMENTS STAGE 1: NORMAL
BH CV STRESS DOSE REGADENOSON STAGE 1: 0.4
BH CV STRESS DURATION MIN STAGE 1: 0
BH CV STRESS DURATION SEC STAGE 1: 10
BH CV STRESS HR STAGE 1: 83
BH CV STRESS NUCLEAR ISOTOPE DOSE: 35.3 MCI
BH CV STRESS O2 STAGE 1: 96
BH CV STRESS PROTOCOL 1: NORMAL
BH CV STRESS RECOVERY BP: NORMAL MMHG
BH CV STRESS RECOVERY HR: 75 BPM
BH CV STRESS RECOVERY O2: 96 %
BH CV STRESS STAGE 1: 1
BH CV THREE MINUTE POST TECH DATA BLOOD PRESSURE: NORMAL MMHG
BH CV THREE MINUTE POST TECH DATA HEART RATE: 78 BPM
BH CV THREE MINUTE POST TECH DATA OXYGEN SATURATION: 96 %
LEFT ATRIUM VOLUME INDEX: 27 ML/M2
LV EF NUC BP: 50 %
MAXIMAL PREDICTED HEART RATE: 155 BPM
PERCENT MAX PREDICTED HR: 53.55 %
STRESS BASELINE BP: NORMAL MMHG
STRESS BASELINE HR: 68 BPM
STRESS O2 SAT REST: 94 %
STRESS PERCENT HR: 63 %
STRESS POST O2 SAT PEAK: 96 %
STRESS POST PEAK BP: NORMAL MMHG
STRESS POST PEAK HR: 83 BPM
STRESS TARGET HR: 132 BPM

## 2024-04-29 PROCEDURE — 93018 CV STRESS TEST I&R ONLY: CPT | Performed by: INTERNAL MEDICINE

## 2024-04-29 PROCEDURE — 25010000002 REGADENOSON 0.4 MG/5ML SOLUTION: Performed by: INTERNAL MEDICINE

## 2024-04-29 PROCEDURE — 93016 CV STRESS TEST SUPVJ ONLY: CPT | Performed by: NURSE PRACTITIONER

## 2024-04-29 PROCEDURE — 93306 TTE W/DOPPLER COMPLETE: CPT | Performed by: INTERNAL MEDICINE

## 2024-04-29 PROCEDURE — A9502 TC99M TETROFOSMIN: HCPCS | Performed by: INTERNAL MEDICINE

## 2024-04-29 PROCEDURE — 0 TECHNETIUM TETROFOSMIN KIT: Performed by: INTERNAL MEDICINE

## 2024-04-29 PROCEDURE — 93017 CV STRESS TEST TRACING ONLY: CPT

## 2024-04-29 PROCEDURE — 78452 HT MUSCLE IMAGE SPECT MULT: CPT | Performed by: INTERNAL MEDICINE

## 2024-04-29 PROCEDURE — 93306 TTE W/DOPPLER COMPLETE: CPT

## 2024-04-29 PROCEDURE — 78452 HT MUSCLE IMAGE SPECT MULT: CPT

## 2024-04-29 RX ORDER — REGADENOSON 0.08 MG/ML
0.4 INJECTION, SOLUTION INTRAVENOUS
Status: COMPLETED | OUTPATIENT
Start: 2024-04-29 | End: 2024-04-29

## 2024-04-29 RX ADMIN — TETROFOSMIN 1 DOSE: 1.38 INJECTION, POWDER, LYOPHILIZED, FOR SOLUTION INTRAVENOUS at 09:14

## 2024-04-29 RX ADMIN — TETROFOSMIN 1 DOSE: 1.38 INJECTION, POWDER, LYOPHILIZED, FOR SOLUTION INTRAVENOUS at 08:04

## 2024-04-29 RX ADMIN — REGADENOSON 0.4 MG: 0.08 INJECTION, SOLUTION INTRAVENOUS at 09:14

## 2024-05-07 ENCOUNTER — OFFICE VISIT (OUTPATIENT)
Dept: CARDIOLOGY | Facility: CLINIC | Age: 64
End: 2024-05-07
Payer: COMMERCIAL

## 2024-05-07 VITALS
SYSTOLIC BLOOD PRESSURE: 147 MMHG | WEIGHT: 218 LBS | DIASTOLIC BLOOD PRESSURE: 75 MMHG | HEIGHT: 65 IN | HEART RATE: 77 BPM | BODY MASS INDEX: 36.32 KG/M2

## 2024-05-07 DIAGNOSIS — I10 PRIMARY HYPERTENSION: Primary | ICD-10-CM

## 2024-05-07 DIAGNOSIS — I25.10 CORONARY ARTERY DISEASE INVOLVING NATIVE CORONARY ARTERY OF NATIVE HEART WITHOUT ANGINA PECTORIS: ICD-10-CM

## 2024-05-07 DIAGNOSIS — I51.89 DIASTOLIC DYSFUNCTION: ICD-10-CM

## 2024-05-08 ENCOUNTER — OFFICE VISIT (OUTPATIENT)
Dept: FAMILY MEDICINE CLINIC | Facility: CLINIC | Age: 64
End: 2024-05-08
Payer: COMMERCIAL

## 2024-05-08 VITALS
TEMPERATURE: 98.7 F | BODY MASS INDEX: 36.32 KG/M2 | DIASTOLIC BLOOD PRESSURE: 71 MMHG | SYSTOLIC BLOOD PRESSURE: 144 MMHG | HEART RATE: 78 BPM | OXYGEN SATURATION: 100 % | WEIGHT: 218 LBS | HEIGHT: 65 IN

## 2024-05-08 DIAGNOSIS — Z91.09 ENVIRONMENTAL ALLERGIES: ICD-10-CM

## 2024-05-08 DIAGNOSIS — R05.1 ACUTE COUGH: Primary | ICD-10-CM

## 2024-05-08 LAB
EXPIRATION DATE: NORMAL
FLUAV AG UPPER RESP QL IA.RAPID: NOT DETECTED
FLUBV AG UPPER RESP QL IA.RAPID: NOT DETECTED
INTERNAL CONTROL: NORMAL
Lab: NORMAL
SARS-COV-2 AG UPPER RESP QL IA.RAPID: NORMAL

## 2024-05-08 PROCEDURE — 99213 OFFICE O/P EST LOW 20 MIN: CPT

## 2024-05-08 PROCEDURE — 87428 SARSCOV & INF VIR A&B AG IA: CPT

## 2024-05-08 RX ORDER — CETIRIZINE HYDROCHLORIDE 10 MG/1
10 TABLET ORAL DAILY
Qty: 30 TABLET | Refills: 0 | Status: SHIPPED | OUTPATIENT
Start: 2024-05-08

## 2024-05-15 ENCOUNTER — TELEPHONE (OUTPATIENT)
Dept: FAMILY MEDICINE CLINIC | Facility: CLINIC | Age: 64
End: 2024-05-15
Payer: COMMERCIAL

## 2024-05-17 RX ORDER — SEMAGLUTIDE 0.68 MG/ML
INJECTION, SOLUTION SUBCUTANEOUS
Qty: 3 ML | Refills: 1 | Status: SHIPPED | OUTPATIENT
Start: 2024-05-17

## 2024-05-21 ENCOUNTER — TELEPHONE (OUTPATIENT)
Dept: FAMILY MEDICINE CLINIC | Facility: CLINIC | Age: 64
End: 2024-05-21
Payer: COMMERCIAL

## 2024-05-21 NOTE — TELEPHONE ENCOUNTER
Name: Sowmya Scott    Relationship: Self    Best Callback Number: [phone]    HUB PROVIDED THE RELAY MESSAGE FROM THE OFFICE   PATIENT VOICED UNDERSTANDING AND HAS NO FURTHER QUESTIONS AT THIS TIME    ADDITIONAL INFORMATION: PATIENT STATES HE WILL COMPLETE THE ORDER.

## 2024-05-28 ENCOUNTER — LAB (OUTPATIENT)
Dept: LAB | Facility: HOSPITAL | Age: 64
End: 2024-05-28
Payer: COMMERCIAL

## 2024-05-28 DIAGNOSIS — D64.9 ANEMIA, UNSPECIFIED TYPE: ICD-10-CM

## 2024-05-28 LAB — HEMOCCULT STL QL IA: NEGATIVE

## 2024-05-28 PROCEDURE — 82274 ASSAY TEST FOR BLOOD FECAL: CPT

## 2024-05-29 ENCOUNTER — LAB (OUTPATIENT)
Dept: LAB | Facility: HOSPITAL | Age: 64
End: 2024-05-29
Payer: COMMERCIAL

## 2024-05-29 DIAGNOSIS — D64.9 ANEMIA, UNSPECIFIED TYPE: ICD-10-CM

## 2024-05-29 LAB — HEMOCCULT STL QL IA: NEGATIVE

## 2024-05-29 PROCEDURE — 82274 ASSAY TEST FOR BLOOD FECAL: CPT

## 2024-05-30 ENCOUNTER — LAB (OUTPATIENT)
Dept: LAB | Facility: HOSPITAL | Age: 64
End: 2024-05-30
Payer: COMMERCIAL

## 2024-05-30 ENCOUNTER — TELEPHONE (OUTPATIENT)
Dept: FAMILY MEDICINE CLINIC | Facility: CLINIC | Age: 64
End: 2024-05-30

## 2024-05-30 DIAGNOSIS — D64.9 ANEMIA, UNSPECIFIED TYPE: ICD-10-CM

## 2024-05-30 LAB — HEMOCCULT STL QL IA: NEGATIVE

## 2024-05-30 PROCEDURE — 82274 ASSAY TEST FOR BLOOD FECAL: CPT

## 2024-05-30 NOTE — TELEPHONE ENCOUNTER
OK FOR HUB TO RELAY    Attempted to contact pt, left message to return call to office. Please inform patient :     All 3 occult bloods are negative, we will recheck CBC with next set of regular labs

## 2024-06-05 ENCOUNTER — OFFICE VISIT (OUTPATIENT)
Dept: FAMILY MEDICINE CLINIC | Facility: CLINIC | Age: 64
End: 2024-06-05
Payer: COMMERCIAL

## 2024-06-05 VITALS
WEIGHT: 219 LBS | SYSTOLIC BLOOD PRESSURE: 128 MMHG | HEART RATE: 56 BPM | OXYGEN SATURATION: 98 % | HEIGHT: 65 IN | DIASTOLIC BLOOD PRESSURE: 72 MMHG | BODY MASS INDEX: 36.49 KG/M2

## 2024-06-05 DIAGNOSIS — Z79.4 TYPE 2 DIABETES MELLITUS WITH HYPERGLYCEMIA, WITH LONG-TERM CURRENT USE OF INSULIN: Primary | ICD-10-CM

## 2024-06-05 DIAGNOSIS — E11.65 TYPE 2 DIABETES MELLITUS WITH HYPERGLYCEMIA, WITH LONG-TERM CURRENT USE OF INSULIN: Primary | ICD-10-CM

## 2024-06-05 DIAGNOSIS — K21.9 GASTROESOPHAGEAL REFLUX DISEASE, UNSPECIFIED WHETHER ESOPHAGITIS PRESENT: ICD-10-CM

## 2024-06-05 DIAGNOSIS — D50.9 IRON DEFICIENCY ANEMIA, UNSPECIFIED IRON DEFICIENCY ANEMIA TYPE: ICD-10-CM

## 2024-06-05 PROCEDURE — 99214 OFFICE O/P EST MOD 30 MIN: CPT | Performed by: NURSE PRACTITIONER

## 2024-06-05 RX ORDER — OMEPRAZOLE 40 MG/1
40 CAPSULE, DELAYED RELEASE ORAL DAILY
Qty: 90 CAPSULE | Refills: 1 | Status: SHIPPED | OUTPATIENT
Start: 2024-06-05

## 2024-06-05 RX ORDER — SEMAGLUTIDE 0.68 MG/ML
0.5 INJECTION, SOLUTION SUBCUTANEOUS WEEKLY
Qty: 3 ML | Refills: 1 | Status: SHIPPED | OUTPATIENT
Start: 2024-06-05

## 2024-06-05 RX ORDER — DEXLANSOPRAZOLE 60 MG/1
60 CAPSULE, DELAYED RELEASE ORAL DAILY
Qty: 90 CAPSULE | Refills: 1 | Status: CANCELLED | OUTPATIENT
Start: 2024-06-05

## 2024-06-05 NOTE — PROGRESS NOTES
Chief Complaint  Diabetes and Hypertension    SUBJECTIVE  Said Tyler presents to [facility] for three month follow up on  Diabetes and Hypertension.     Pt states BG is has still been running high after eating, thinks he may need to restart his short acting insulin.    Patient states tolerating the Ozempic fairly well, does have a little bit of nausea the day after the injection, but nothing very bad, he is ready to increase the dose      History of Present Illness  Past Medical History:   Diagnosis Date    Diabetes mellitus     GERD (gastroesophageal reflux disease)     Hyperlipidemia     Hypertension     Hyperthyroidism     Kidney stone       Family History   Problem Relation Age of Onset    Diabetes Mother     Hyperlipidemia Mother       Past Surgical History:   Procedure Laterality Date    INNER EAR SURGERY Left     KIDNEY STONE SURGERY          Current Outpatient Medications:     allopurinol (ZYLOPRIM) 300 MG tablet, Take 1 tablet by mouth Daily., Disp: 90 tablet, Rfl: 1    amLODIPine-valsartan (EXFORGE)  MG per tablet, Take 1 tablet by mouth Daily for 180 days., Disp: 90 tablet, Rfl: 1    atorvastatin (LIPITOR) 20 MG tablet, Take 1 tablet by mouth Daily., Disp: 90 tablet, Rfl: 1    bisoprolol (ZEBeta) 5 MG tablet, TAKE 1 TABLET BY MOUTH EVERY DAY, Disp: 90 tablet, Rfl: 1    ferrous gluconate (FERGON) 324 MG tablet, Take 1 tablet by mouth Daily With Breakfast., Disp: 90 tablet, Rfl: 1    glucose monitor monitoring kit, Glucometer, strips, and lancets to test TID PRN, Disp: 90 each, Rfl: 1    insulin aspart (novoLOG FLEXPEN) 100 UNIT/ML solution pen-injector sc pen, Inject 20 Units under the skin into the appropriate area as directed 2 (Two) Times a Day With Meals for 180 days. Inject 10 units in the morning and 10 units in the evening., Disp: 12 mL, Rfl: 2    metFORMIN ER (GLUCOPHAGE-XR) 500 MG 24 hr tablet, Take 2 tablets by mouth Daily With Breakfast., Disp: 180  "tablet, Rfl: 1    Semaglutide,0.25 or 0.5MG/DOS, (Ozempic, 0.25 or 0.5 MG/DOSE,) 2 MG/3ML solution pen-injector, Inject 0.5 mg under the skin into the appropriate area as directed 1 (One) Time Per Week., Disp: 3 mL, Rfl: 1    omeprazole (priLOSEC) 40 MG capsule, Take 1 capsule by mouth Daily., Disp: 90 capsule, Rfl: 1    OBJECTIVE  Vital Signs:   /72   Pulse 56   Ht 165.1 cm (65\")   Wt 99.3 kg (219 lb)   SpO2 98%   BMI 36.44 kg/m²    Estimated body mass index is 36.44 kg/m² as calculated from the following:    Height as of this encounter: 165.1 cm (65\").    Weight as of this encounter: 99.3 kg (219 lb).     Wt Readings from Last 3 Encounters:   06/05/24 99.3 kg (219 lb)   05/08/24 98.9 kg (218 lb)   05/07/24 98.9 kg (218 lb)     BP Readings from Last 3 Encounters:   06/05/24 128/72   05/08/24 144/71   05/07/24 147/75       Physical Exam  Vitals reviewed.   Constitutional:       Appearance: Normal appearance. He is well-developed.   HENT:      Head: Normocephalic and atraumatic.      Right Ear: External ear normal.      Left Ear: External ear normal.   Eyes:      Conjunctiva/sclera: Conjunctivae normal.      Pupils: Pupils are equal, round, and reactive to light.   Cardiovascular:      Rate and Rhythm: Normal rate and regular rhythm.      Heart sounds: No murmur heard.     No friction rub. No gallop.   Pulmonary:      Effort: Pulmonary effort is normal.      Breath sounds: Normal breath sounds. No wheezing or rhonchi.   Skin:     General: Skin is warm and dry.   Neurological:      Mental Status: He is alert and oriented to person, place, and time.      Cranial Nerves: No cranial nerve deficit.   Psychiatric:         Mood and Affect: Mood and affect normal.         Behavior: Behavior normal.         Thought Content: Thought content normal.         Judgment: Judgment normal.          Result Review    CMP          9/21/2023    12:05 3/5/2024    12:11 3/6/2024    11:15   CMP   Glucose 196  220  205    BUN 35  " 44  45    Creatinine 1.83  2.06  1.94    EGFR 40.7  35.1  37.7     31    Sodium 139  137  136    Potassium 5.0  5.1  4.9    Chloride 106  105  102    Calcium 9.6  10.0  9.4    Total Protein  7.4     Albumin 4.2  4.2  4.1    Globulin  3.2     Total Bilirubin  0.3     Alkaline Phosphatase  79     AST (SGOT)  14     ALT (SGPT)  14     Albumin/Globulin Ratio  1.3     BUN/Creatinine Ratio 19.1  21.4  23.2    Anion Gap 12.2  13.4  16.5      CBC          9/21/2023    12:05 3/5/2024    12:11   CBC   WBC 9.34  9.34    RBC 4.34  4.08    Hemoglobin 12.4  11.9    Hematocrit 37.5  36.0    MCV 86.4  88.2    MCH 28.6  29.2    MCHC 33.1  33.1    RDW 14.2  14.5    Platelets 208  206      Lipid Panel          3/5/2024    12:11   Lipid Panel   Total Cholesterol 161    Triglycerides 175    HDL Cholesterol 46    VLDL Cholesterol 30    LDL Cholesterol  85    LDL/HDL Ratio 1.74      TSH          3/5/2024    12:11   TSH   TSH 2.920      Most Recent A1C          3/5/2024    12:11   HGBA1C Most Recent   Hemoglobin A1C 9.00        A1C Last 3 Results          6/26/2023    14:58 9/21/2023    12:05 3/5/2024    12:11   HGBA1C Last 3 Results   Hemoglobin A1C 8.6  8.40  9.00               Lab Results   Component Value Date    HYZITRDA76 565 03/05/2024       XR Chest 2 View    Result Date: 3/5/2024   Normal chest     Jeremiah Carpenter MD       Electronically Signed and Approved By: Jeremiah Carpenter MD on 3/05/2024 at 13:31                The above data has been reviewed by ANETTE Galvan 06/05/2024 11:45 EDT.          Patient Care Team:  Brandi Puente APRN as PCP - General (Nurse Practitioner)            ASSESSMENT & PLAN    Diagnoses and all orders for this visit:    1. Type 2 diabetes mellitus with hyperglycemia, with long-term current use of insulin (Primary)  Comments:  Have refilled patient's NovoLog FlexPen.  Orders:  -     Hemoglobin A1c; Future  -     Semaglutide,0.25 or 0.5MG/DOS, (Ozempic, 0.25 or 0.5 MG/DOSE,) 2 MG/3ML solution  pen-injector; Inject 0.5 mg under the skin into the appropriate area as directed 1 (One) Time Per Week.  Dispense: 3 mL; Refill: 1  -     insulin aspart (novoLOG FLEXPEN) 100 UNIT/ML solution pen-injector sc pen; Inject 20 Units under the skin into the appropriate area as directed 2 (Two) Times a Day With Meals for 180 days. Inject 10 units in the morning and 10 units in the evening.  Dispense: 12 mL; Refill: 2    2. Iron deficiency anemia, unspecified iron deficiency anemia type  Assessment & Plan:  Patient has been taking iron consistently, states that he is feeling a bit more energy, we will recheck levels today, recently turned in occult blood cards which were negative    Orders:  -     Iron Profile; Future    3. Gastroesophageal reflux disease, unspecified whether esophagitis present  Overview:  Patient reports Dexilant was too expensive, has been buying over-the-counter 20 mg omeprazole which does help some, after discussion we have decided to trial omeprazole 40 mg, new prescription sent    Orders:  -     omeprazole (priLOSEC) 40 MG capsule; Take 1 capsule by mouth Daily.  Dispense: 90 capsule; Refill: 1         Tobacco Use: Low Risk  (6/5/2024)    Patient History     Smoking Tobacco Use: Never     Smokeless Tobacco Use: Never     Passive Exposure: Never       Follow Up     Return in about 3 months (around 9/5/2024), or if symptoms worsen or fail to improve.        Patient was given instructions and counseling regarding his condition or for health maintenance advice. Please see specific information pulled into the AVS if appropriate.   I have reviewed information obtained and documented by others and I have confirmed the accuracy of this documented note.    ANETTE Galvan

## 2024-06-05 NOTE — ASSESSMENT & PLAN NOTE
Patient has been taking iron consistently, states that he is feeling a bit more energy, we will recheck levels today, recently turned in occult blood cards which were negative

## 2024-06-06 ENCOUNTER — LAB (OUTPATIENT)
Dept: LAB | Facility: HOSPITAL | Age: 64
End: 2024-06-06
Payer: COMMERCIAL

## 2024-06-06 DIAGNOSIS — D50.9 IRON DEFICIENCY ANEMIA, UNSPECIFIED IRON DEFICIENCY ANEMIA TYPE: ICD-10-CM

## 2024-06-06 DIAGNOSIS — Z79.4 TYPE 2 DIABETES MELLITUS WITH HYPERGLYCEMIA, WITH LONG-TERM CURRENT USE OF INSULIN: ICD-10-CM

## 2024-06-06 DIAGNOSIS — E11.65 TYPE 2 DIABETES MELLITUS WITH HYPERGLYCEMIA, WITH LONG-TERM CURRENT USE OF INSULIN: ICD-10-CM

## 2024-06-06 LAB
HBA1C MFR BLD: 7.7 % (ref 4.8–5.6)
IRON 24H UR-MRATE: 59 MCG/DL (ref 59–158)
IRON SATN MFR SERPL: 16 % (ref 20–50)
TIBC SERPL-MCNC: 377 MCG/DL (ref 298–536)
TRANSFERRIN SERPL-MCNC: 253 MG/DL (ref 200–360)

## 2024-06-06 PROCEDURE — 83540 ASSAY OF IRON: CPT

## 2024-06-06 PROCEDURE — 83036 HEMOGLOBIN GLYCOSYLATED A1C: CPT

## 2024-06-06 PROCEDURE — 36415 COLL VENOUS BLD VENIPUNCTURE: CPT

## 2024-06-06 PROCEDURE — 84466 ASSAY OF TRANSFERRIN: CPT

## 2024-07-10 ENCOUNTER — OFFICE VISIT (OUTPATIENT)
Dept: FAMILY MEDICINE CLINIC | Facility: CLINIC | Age: 64
End: 2024-07-10
Payer: COMMERCIAL

## 2024-07-10 ENCOUNTER — TELEPHONE (OUTPATIENT)
Dept: FAMILY MEDICINE CLINIC | Facility: CLINIC | Age: 64
End: 2024-07-10

## 2024-07-10 VITALS
SYSTOLIC BLOOD PRESSURE: 148 MMHG | HEART RATE: 60 BPM | HEIGHT: 65 IN | BODY MASS INDEX: 36.99 KG/M2 | WEIGHT: 222 LBS | OXYGEN SATURATION: 97 % | DIASTOLIC BLOOD PRESSURE: 62 MMHG

## 2024-07-10 DIAGNOSIS — H60.502 ACUTE OTITIS EXTERNA OF LEFT EAR, UNSPECIFIED TYPE: Primary | ICD-10-CM

## 2024-07-10 PROCEDURE — 99213 OFFICE O/P EST LOW 20 MIN: CPT | Performed by: NURSE PRACTITIONER

## 2024-07-10 RX ORDER — CIPROFLOXACIN AND DEXAMETHASONE 3; 1 MG/ML; MG/ML
4 SUSPENSION/ DROPS AURICULAR (OTIC) 2 TIMES DAILY
Qty: 7.5 ML | Refills: 0 | Status: SHIPPED | OUTPATIENT
Start: 2024-07-10 | End: 2024-07-17

## 2024-07-10 NOTE — TELEPHONE ENCOUNTER
Patient states he went to his [facility] pharmacy to  his prescription ear drops but the pharmacy states they had not received the script. Medication is ciprofloxacin-dexAMETHasone (Ciprodex) 0.3-0.1 % otic suspension . Patient would like a call back when resolved.

## 2024-07-10 NOTE — PROGRESS NOTES
Chief Complaint  Earache and Jaw Pain    SUBJECTIVE  Said Tyler presents to [facility]     Patient presents today with complaints of left ear pain, down into the jaw and neck at times.  Patient states started yesterday, sometimes feels like a pressure, sometimes more painful, daughter notes he was also having some drainage out of the ear yesterday.  No fever, no other symptoms    History of Present Illness  Past Medical History:   Diagnosis Date    Diabetes mellitus     GERD (gastroesophageal reflux disease)     Hyperlipidemia     Hypertension     Hyperthyroidism     Kidney stone       Family History   Problem Relation Age of Onset    Diabetes Mother     Hyperlipidemia Mother       Past Surgical History:   Procedure Laterality Date    INNER EAR SURGERY Left     KIDNEY STONE SURGERY          Current Outpatient Medications:     allopurinol (ZYLOPRIM) 300 MG tablet, Take 1 tablet by mouth Daily., Disp: 90 tablet, Rfl: 1    amLODIPine-valsartan (EXFORGE)  MG per tablet, Take 1 tablet by mouth Daily for 180 days., Disp: 90 tablet, Rfl: 1    atorvastatin (LIPITOR) 20 MG tablet, Take 1 tablet by mouth Daily., Disp: 90 tablet, Rfl: 1    bisoprolol (ZEBeta) 5 MG tablet, TAKE 1 TABLET BY MOUTH EVERY DAY, Disp: 90 tablet, Rfl: 1    ferrous gluconate (FERGON) 324 MG tablet, Take 1 tablet by mouth Daily With Breakfast., Disp: 90 tablet, Rfl: 1    glucose monitor monitoring kit, Glucometer, strips, and lancets to test TID PRN, Disp: 90 each, Rfl: 1    insulin aspart (novoLOG FLEXPEN) 100 UNIT/ML solution pen-injector sc pen, Inject 20 Units under the skin into the appropriate area as directed 2 (Two) Times a Day With Meals for 180 days. Inject 10 units in the morning and 10 units in the evening., Disp: 12 mL, Rfl: 2    metFORMIN ER (GLUCOPHAGE-XR) 500 MG 24 hr tablet, Take 2 tablets by mouth Daily With Breakfast., Disp: 180 tablet, Rfl: 1    omeprazole (priLOSEC) 40 MG capsule, Take 1  "capsule by mouth Daily., Disp: 90 capsule, Rfl: 1    Semaglutide,0.25 or 0.5MG/DOS, (Ozempic, 0.25 or 0.5 MG/DOSE,) 2 MG/3ML solution pen-injector, Inject 0.5 mg under the skin into the appropriate area as directed 1 (One) Time Per Week., Disp: 3 mL, Rfl: 1    ciprofloxacin-dexAMETHasone (Ciprodex) 0.3-0.1 % otic suspension, Administer 4 drops into the left ear 2 (Two) Times a Day for 7 days., Disp: 7.5 mL, Rfl: 0    OBJECTIVE  Vital Signs:   /62   Pulse 60   Ht 165.1 cm (65\")   Wt 101 kg (222 lb)   SpO2 97%   BMI 36.94 kg/m²    Estimated body mass index is 36.94 kg/m² as calculated from the following:    Height as of this encounter: 165.1 cm (65\").    Weight as of this encounter: 101 kg (222 lb).     Wt Readings from Last 3 Encounters:   07/10/24 101 kg (222 lb)   06/05/24 99.3 kg (219 lb)   05/08/24 98.9 kg (218 lb)     BP Readings from Last 3 Encounters:   07/10/24 148/62   06/05/24 128/72   05/08/24 144/71       Physical Exam  Vitals reviewed.   Constitutional:       Appearance: Normal appearance. He is well-developed.   HENT:      Head: Normocephalic and atraumatic.      Right Ear: Tympanic membrane, ear canal and external ear normal.      Left Ear: External ear normal. Drainage and tenderness present.      Ears:      Comments: Left canal erythematous, swollen, moderate amount of drainage noted, tragal tenderness noted,     Mouth/Throat:      Mouth: Mucous membranes are moist.      Pharynx: Oropharynx is clear. No oropharyngeal exudate or posterior oropharyngeal erythema.   Eyes:      Conjunctiva/sclera: Conjunctivae normal.      Pupils: Pupils are equal, round, and reactive to light.   Cardiovascular:      Rate and Rhythm: Normal rate and regular rhythm.      Heart sounds: No murmur heard.     No friction rub. No gallop.   Pulmonary:      Effort: Pulmonary effort is normal.      Breath sounds: Normal breath sounds. No wheezing or rhonchi.   Lymphadenopathy:      Cervical: No cervical adenopathy. "   Skin:     General: Skin is warm and dry.   Neurological:      Mental Status: He is alert and oriented to person, place, and time.      Cranial Nerves: No cranial nerve deficit.   Psychiatric:         Mood and Affect: Mood and affect normal.         Behavior: Behavior normal.         Thought Content: Thought content normal.         Judgment: Judgment normal.          Result Review        No Images in the past 120 days found..     The above data has been reviewed by ANETTE Galvan 07/10/2024 09:34 EDT.          Patient Care Team:  Brandi Puente APRN as PCP - General (Nurse Practitioner)            ASSESSMENT & PLAN    Diagnoses and all orders for this visit:    1. Acute otitis externa of left ear, unspecified type (Primary)  Comments:  Discussed may place cotton ball in external ear, not canal, for drainage, OTC Tylenol as needed  Orders:  -     ciprofloxacin-dexAMETHasone (Ciprodex) 0.3-0.1 % otic suspension; Administer 4 drops into the left ear 2 (Two) Times a Day for 7 days.  Dispense: 7.5 mL; Refill: 0         Tobacco Use: Low Risk  (7/10/2024)    Patient History     Smoking Tobacco Use: Never     Smokeless Tobacco Use: Never     Passive Exposure: Never       Follow Up     Return if symptoms worsen or fail to improve.        Patient was given instructions and counseling regarding his condition or for health maintenance advice. Please see specific information pulled into the AVS if appropriate.   I have reviewed information obtained and documented by others and I have confirmed the accuracy of this documented note.    ANETTE Galvan

## 2024-07-10 NOTE — TELEPHONE ENCOUNTER
Called pharmacy, Rx confirmed 7/10/24 at 934am.  Per PharmD Rx needs CLARKE Durham MA is working on PA and will update patient and provider with outcome.

## 2024-07-29 ENCOUNTER — TELEPHONE (OUTPATIENT)
Dept: FAMILY MEDICINE CLINIC | Facility: CLINIC | Age: 64
End: 2024-07-29
Payer: COMMERCIAL

## 2024-07-29 DIAGNOSIS — Z79.4 TYPE 2 DIABETES MELLITUS WITH HYPERGLYCEMIA, WITH LONG-TERM CURRENT USE OF INSULIN: ICD-10-CM

## 2024-07-29 DIAGNOSIS — H92.09 OTALGIA, UNSPECIFIED LATERALITY: ICD-10-CM

## 2024-07-29 DIAGNOSIS — E11.65 TYPE 2 DIABETES MELLITUS WITH HYPERGLYCEMIA, WITH LONG-TERM CURRENT USE OF INSULIN: ICD-10-CM

## 2024-07-29 DIAGNOSIS — H60.90 OTITIS EXTERNA, UNSPECIFIED CHRONICITY, UNSPECIFIED LATERALITY, UNSPECIFIED TYPE: Primary | ICD-10-CM

## 2024-07-29 RX ORDER — SEMAGLUTIDE 0.68 MG/ML
INJECTION, SOLUTION SUBCUTANEOUS
Qty: 3 ML | Refills: 1 | Status: SHIPPED | OUTPATIENT
Start: 2024-07-29

## 2024-07-29 NOTE — TELEPHONE ENCOUNTER
Caller: LINDA POOLE    Relationship: Emergency Contact    Best call back number: [phone]     What is the medical concern/diagnosis: EAR INFECTION/ HEARING TROUBLE    What specialty or service is being requested: ENT    What is the provider, practice or medical service name: ANY    What is the office location: ANY        Any additional details: PATIENTS DAUGHTER STATES THAT HE SEEN APRN FOR AN EAR INFECTION AND WAS GIVEN MEDICATION FOR IT AND THE PAIN AND TROUBLE HEARING STILL PERSISTS    PATIENTS DAUGHTER STATES THAT THE PATIENT WOULD LIKE TO BE REFERRED TO AN ENT SPECIALIST

## 2024-07-29 NOTE — TELEPHONE ENCOUNTER
I will place referral, but please let patient know that ENT appointments are several months out, if his symptoms persist recommend follow-up for reevaluation, I am placing this referral to Dr. Butler office so that we can try to get the patient seen more quickly

## 2024-08-05 ENCOUNTER — OFFICE VISIT (OUTPATIENT)
Dept: FAMILY MEDICINE CLINIC | Facility: CLINIC | Age: 64
End: 2024-08-05
Payer: COMMERCIAL

## 2024-08-05 DIAGNOSIS — H92.02 OTALGIA OF LEFT EAR: Primary | ICD-10-CM

## 2024-08-05 DIAGNOSIS — Z79.4 TYPE 2 DIABETES MELLITUS WITH HYPERGLYCEMIA, WITH LONG-TERM CURRENT USE OF INSULIN: ICD-10-CM

## 2024-08-05 DIAGNOSIS — E11.65 TYPE 2 DIABETES MELLITUS WITH HYPERGLYCEMIA, WITH LONG-TERM CURRENT USE OF INSULIN: ICD-10-CM

## 2024-08-05 DIAGNOSIS — H92.12 OTORRHEA OF LEFT EAR: ICD-10-CM

## 2024-08-05 PROCEDURE — 87102 FUNGUS ISOLATION CULTURE: CPT | Performed by: NURSE PRACTITIONER

## 2024-08-05 PROCEDURE — 87070 CULTURE OTHR SPECIMN AEROBIC: CPT | Performed by: NURSE PRACTITIONER

## 2024-08-05 PROCEDURE — 99213 OFFICE O/P EST LOW 20 MIN: CPT | Performed by: NURSE PRACTITIONER

## 2024-08-05 PROCEDURE — 87205 SMEAR GRAM STAIN: CPT | Performed by: NURSE PRACTITIONER

## 2024-08-05 RX ORDER — TAMSULOSIN HYDROCHLORIDE 0.4 MG/1
1 CAPSULE ORAL DAILY
COMMUNITY

## 2024-08-05 RX ORDER — AMOXICILLIN 875 MG/1
875 TABLET, COATED ORAL 2 TIMES DAILY
Qty: 20 TABLET | Refills: 0 | Status: SHIPPED | OUTPATIENT
Start: 2024-08-05

## 2024-08-05 RX ORDER — HYDROCHLOROTHIAZIDE 12.5 MG/1
1 TABLET ORAL DAILY
COMMUNITY
Start: 2024-07-21

## 2024-08-05 NOTE — PROGRESS NOTES
Chief Complaint  Hearing Problem and Earache    SUBJECTIVE  Said Tyler presents to [facility] due to hearing problem and earache. Referral was sent to ENT but no appt made yet.  Patient states that he finished the drops prescribed previously but he continues to have off-and-on ear pain and significantly decreased hearing in the ear.    Pt requesting referral to Opthmalaogist for DM eye exam.       History of Present Illness  Past Medical History:   Diagnosis Date    Diabetes mellitus     GERD (gastroesophageal reflux disease)     Hyperlipidemia     Hypertension     Hyperthyroidism     Kidney stone       Family History   Problem Relation Age of Onset    Diabetes Mother     Hyperlipidemia Mother       Past Surgical History:   Procedure Laterality Date    INNER EAR SURGERY Left     KIDNEY STONE SURGERY          Current Outpatient Medications:     allopurinol (ZYLOPRIM) 300 MG tablet, Take 1 tablet by mouth Daily., Disp: 90 tablet, Rfl: 1    amLODIPine-valsartan (EXFORGE)  MG per tablet, Take 1 tablet by mouth Daily for 180 days., Disp: 90 tablet, Rfl: 1    atorvastatin (LIPITOR) 20 MG tablet, Take 1 tablet by mouth Daily., Disp: 90 tablet, Rfl: 1    bisoprolol (ZEBeta) 5 MG tablet, TAKE 1 TABLET BY MOUTH EVERY DAY, Disp: 90 tablet, Rfl: 1    ferrous gluconate (FERGON) 324 MG tablet, Take 1 tablet by mouth Daily With Breakfast., Disp: 90 tablet, Rfl: 1    glucose monitor monitoring kit, Glucometer, strips, and lancets to test TID PRN, Disp: 90 each, Rfl: 1    hydroCHLOROthiazide 12.5 MG tablet, Take 1 tablet by mouth Daily., Disp: , Rfl:     insulin aspart (novoLOG FLEXPEN) 100 UNIT/ML solution pen-injector sc pen, Inject 20 Units under the skin into the appropriate area as directed 2 (Two) Times a Day With Meals for 180 days. Inject 10 units in the morning and 10 units in the evening., Disp: 12 mL, Rfl: 2    metFORMIN ER (GLUCOPHAGE-XR) 500 MG 24 hr tablet, Take 2 tablets by  "mouth Daily With Breakfast., Disp: 180 tablet, Rfl: 1    omeprazole (priLOSEC) 40 MG capsule, Take 1 capsule by mouth Daily., Disp: 90 capsule, Rfl: 1    Semaglutide,0.25 or 0.5MG/DOS, (Ozempic, 0.25 or 0.5 MG/DOSE,) 2 MG/3ML solution pen-injector, INJECT 0.5MG UNDER THE SKIN ONCE WEEKLY AS DIRECTED, Disp: 3 mL, Rfl: 1    tamsulosin (FLOMAX) 0.4 MG capsule 24 hr capsule, Take 1 capsule by mouth Daily., Disp: , Rfl:     amoxicillin (AMOXIL) 875 MG tablet, Take 1 tablet by mouth 2 (Two) Times a Day., Disp: 20 tablet, Rfl: 0    OBJECTIVE  Vital Signs:   There were no vitals taken for this visit.   Estimated body mass index is 36.94 kg/m² as calculated from the following:    Height as of 7/10/24: 165.1 cm (65\").    Weight as of 7/10/24: 101 kg (222 lb).     Wt Readings from Last 3 Encounters:   07/10/24 101 kg (222 lb)   06/05/24 99.3 kg (219 lb)   05/08/24 98.9 kg (218 lb)     BP Readings from Last 3 Encounters:   07/10/24 148/62   06/05/24 128/72   05/08/24 144/71       Physical Exam  Vitals reviewed.   Constitutional:       Appearance: Normal appearance. He is well-developed.   HENT:      Head: Normocephalic and atraumatic.      Right Ear: Tympanic membrane, ear canal and external ear normal.      Left Ear: External ear normal.      Ears:      Comments: Left ear: Canal no longer swollen, but still significant amount of discharge noted, cultures obtained for further evaluation, cleared is much discharge as possible/patient able to tolerate with curette , able to see part of top part TM which does appear erythematous  Eyes:      Conjunctiva/sclera: Conjunctivae normal.      Pupils: Pupils are equal, round, and reactive to light.   Cardiovascular:      Rate and Rhythm: Normal rate and regular rhythm.      Heart sounds: No murmur heard.     No friction rub. No gallop.   Pulmonary:      Effort: Pulmonary effort is normal.      Breath sounds: Normal breath sounds. No wheezing or rhonchi.   Skin:     General: Skin is warm " and dry.   Neurological:      Mental Status: He is alert and oriented to person, place, and time.      Cranial Nerves: No cranial nerve deficit.   Psychiatric:         Mood and Affect: Mood and affect normal.         Behavior: Behavior normal.         Thought Content: Thought content normal.         Judgment: Judgment normal.          Result Review        No Images in the past 120 days found..     The above data has been reviewed by ANETTE Galvan 08/05/2024 10:42 EDT.          Patient Care Team:  Brandi Puente APRN as PCP - General (Nurse Practitioner)            ASSESSMENT & PLAN    Diagnoses and all orders for this visit:    1. Otalgia of left ear (Primary)  -     amoxicillin (AMOXIL) 875 MG tablet; Take 1 tablet by mouth 2 (Two) Times a Day.  Dispense: 20 tablet; Refill: 0  -     Wound Culture - Drainage, Ear; Future  -     Fungus Culture - Drainage, Ear; Future  -     Wound Culture - Drainage, Ear  -     Fungus Culture - Drainage, Ear    2. Otorrhea of left ear  -     amoxicillin (AMOXIL) 875 MG tablet; Take 1 tablet by mouth 2 (Two) Times a Day.  Dispense: 20 tablet; Refill: 0  -     Wound Culture - Drainage, Ear; Future  -     Fungus Culture - Drainage, Ear; Future  -     Wound Culture - Drainage, Ear  -     Fungus Culture - Drainage, Ear    3. Type 2 diabetes mellitus with hyperglycemia, with long-term current use of insulin  -     Ambulatory Referral for Diabetic Eye Exam-Ophthalmology    Swab sent for culture and discharge for further evaluation, we are going to go ahead and treat for OM given persistence of pain, referral previously placed to ENT for further evaluation    Tobacco Use: Low Risk  (8/5/2024)    Patient History     Smoking Tobacco Use: Never     Smokeless Tobacco Use: Never     Passive Exposure: Never       Follow Up     Return if symptoms worsen or fail to improve.        Patient was given instructions and counseling regarding his condition or for health maintenance advice.  Please see specific information pulled into the AVS if appropriate.   I have reviewed information obtained and documented by others and I have confirmed the accuracy of this documented note.    ANETTE Galvan

## 2024-08-07 LAB
BACTERIA SPEC AEROBE CULT: ABNORMAL
GRAM STN SPEC: ABNORMAL
GRAM STN SPEC: ABNORMAL

## 2024-08-12 ENCOUNTER — OFFICE VISIT (OUTPATIENT)
Dept: FAMILY MEDICINE CLINIC | Facility: CLINIC | Age: 64
End: 2024-08-12
Payer: COMMERCIAL

## 2024-08-12 VITALS
DIASTOLIC BLOOD PRESSURE: 60 MMHG | SYSTOLIC BLOOD PRESSURE: 139 MMHG | OXYGEN SATURATION: 99 % | HEIGHT: 65 IN | BODY MASS INDEX: 37.49 KG/M2 | HEART RATE: 63 BPM | WEIGHT: 225 LBS

## 2024-08-12 DIAGNOSIS — B36.9 FUNGAL OTITIS EXTERNA: Primary | ICD-10-CM

## 2024-08-12 DIAGNOSIS — H62.40 FUNGAL OTITIS EXTERNA: Primary | ICD-10-CM

## 2024-08-12 LAB
FUNGUS WND CULT: ABNORMAL
FUNGUS WND CULT: ABNORMAL

## 2024-08-12 PROCEDURE — 99213 OFFICE O/P EST LOW 20 MIN: CPT | Performed by: NURSE PRACTITIONER

## 2024-08-12 NOTE — PROGRESS NOTES
Chief Complaint  Earache    SUBJECTIVE  Said Tyler presents to [facility]     Patient presents today to discuss culture results of left ear discharge.  Patient reports he still continues to have the discharge.    Culture grew Aspergillus  History of Present Illness  Past Medical History:   Diagnosis Date    Diabetes mellitus     GERD (gastroesophageal reflux disease)     Hyperlipidemia     Hypertension     Hyperthyroidism     Kidney stone       Family History   Problem Relation Age of Onset    Diabetes Mother     Hyperlipidemia Mother       Past Surgical History:   Procedure Laterality Date    INNER EAR SURGERY Left     KIDNEY STONE SURGERY          Current Outpatient Medications:     allopurinol (ZYLOPRIM) 300 MG tablet, Take 1 tablet by mouth Daily., Disp: 90 tablet, Rfl: 1    amLODIPine-valsartan (EXFORGE)  MG per tablet, Take 1 tablet by mouth Daily for 180 days., Disp: 90 tablet, Rfl: 1    amoxicillin (AMOXIL) 875 MG tablet, Take 1 tablet by mouth 2 (Two) Times a Day., Disp: 20 tablet, Rfl: 0    atorvastatin (LIPITOR) 20 MG tablet, Take 1 tablet by mouth Daily., Disp: 90 tablet, Rfl: 1    bisoprolol (ZEBeta) 5 MG tablet, TAKE 1 TABLET BY MOUTH EVERY DAY, Disp: 90 tablet, Rfl: 1    ferrous gluconate (FERGON) 324 MG tablet, Take 1 tablet by mouth Daily With Breakfast., Disp: 90 tablet, Rfl: 1    glucose monitor monitoring kit, Glucometer, strips, and lancets to test TID PRN, Disp: 90 each, Rfl: 1    hydroCHLOROthiazide 12.5 MG tablet, Take 1 tablet by mouth Daily., Disp: , Rfl:     insulin aspart (novoLOG FLEXPEN) 100 UNIT/ML solution pen-injector sc pen, Inject 20 Units under the skin into the appropriate area as directed 2 (Two) Times a Day With Meals for 180 days. Inject 10 units in the morning and 10 units in the evening., Disp: 12 mL, Rfl: 2    metFORMIN ER (GLUCOPHAGE-XR) 500 MG 24 hr tablet, Take 2 tablets by mouth Daily With Breakfast., Disp: 180 tablet, Rfl: 1     "omeprazole (priLOSEC) 40 MG capsule, Take 1 capsule by mouth Daily., Disp: 90 capsule, Rfl: 1    Semaglutide,0.25 or 0.5MG/DOS, (Ozempic, 0.25 or 0.5 MG/DOSE,) 2 MG/3ML solution pen-injector, INJECT 0.5MG UNDER THE SKIN ONCE WEEKLY AS DIRECTED, Disp: 3 mL, Rfl: 1    tamsulosin (FLOMAX) 0.4 MG capsule 24 hr capsule, Take 1 capsule by mouth Daily., Disp: , Rfl:     OBJECTIVE  Vital Signs:   /60   Pulse 63   Ht 165.1 cm (65\")   Wt 102 kg (225 lb)   SpO2 99%   BMI 37.44 kg/m²    Estimated body mass index is 37.44 kg/m² as calculated from the following:    Height as of this encounter: 165.1 cm (65\").    Weight as of this encounter: 102 kg (225 lb).     Wt Readings from Last 3 Encounters:   08/12/24 102 kg (225 lb)   07/10/24 101 kg (222 lb)   06/05/24 99.3 kg (219 lb)     BP Readings from Last 3 Encounters:   08/12/24 139/60   07/10/24 148/62   06/05/24 128/72       Physical Exam  Vitals reviewed.   Constitutional:       Appearance: Normal appearance. He is well-developed.   HENT:      Head: Normocephalic and atraumatic.      Right Ear: External ear normal.      Left Ear: External ear normal.      Ears:      Comments: Left canal full with discharge, was able to remove a large amount with curette, flushed gently with water only to attempt to remove the remainder,  was unsuccessful, patient tolerated well.   Eyes:      Conjunctiva/sclera: Conjunctivae normal.      Pupils: Pupils are equal, round, and reactive to light.   Cardiovascular:      Rate and Rhythm: Normal rate and regular rhythm.      Heart sounds: No murmur heard.     No friction rub. No gallop.   Pulmonary:      Effort: Pulmonary effort is normal.      Breath sounds: Normal breath sounds. No wheezing or rhonchi.   Skin:     General: Skin is warm and dry.   Neurological:      Mental Status: He is alert and oriented to person, place, and time.      Cranial Nerves: No cranial nerve deficit.   Psychiatric:         Mood and Affect: Mood and affect normal. "         Behavior: Behavior normal.         Thought Content: Thought content normal.         Judgment: Judgment normal.          Result Review        ntains abnormal data Wound Culture - Drainage, Ear  Order: [id number]  Status: Final result       Visible to patient: Yes (seen)       Next appt: 09/05/2024 at 11:45 AM in Family Medicine (ANETTE Galvan)       Dx: Otalgia of left ear; Otorrhea of left...    Specimen Information: Ear; Drainage   2 Result Notes  Wound Culture   Lab   Aspergillus species Abnormal   [facility]               Gram Stain  Lab   Occasional Gram positive bacilli  [facility]      Occasional WBCs seen [facility]                    Specimen Collected: 08/05/24 12:52 ED            No Images in the past 120 days found..     The above data has been reviewed by ANETTE Galvan 08/12/2024 11:23 EDT.          Patient Care Team:  Brandi Puente APRN as PCP - General (Nurse Practitioner)            ASSESSMENT & PLAN    Diagnoses and all orders for this visit:    1. Fungal otitis externa (Primary)     Referral placed to ENT, Dr. Butler,  at last office visit, recontacted office today as they had not yet scheduled the patient and they reported they do not accept his insurance.  Referral changed to [facility], we are attempting to get patient scheduled quickly.    Tobacco Use: Low Risk  (8/12/2024)    Patient History     Smoking Tobacco Use: Never     Smokeless Tobacco Use: Never     Passive Exposure: Never       Follow Up     Return if symptoms worsen or fail to improve.        Patient was given instructions and counseling regarding his condition or for health maintenance advice. Please see specific information pulled into the AVS if appropriate.   I have reviewed information obtained and documented by others and I have confirmed the accuracy of this documented note.    ANETTE Galvan

## 2024-08-22 LAB
FUNGUS ISLT: ABNORMAL
FUNGUS ISLT: ABNORMAL

## 2024-08-28 LAB
FUNGUS WND CULT: ABNORMAL
FUNGUS WND CULT: ABNORMAL

## 2024-08-30 RX ORDER — FERROUS GLUCONATE 324(38)MG
1 TABLET ORAL
Qty: 90 TABLET | Refills: 1 | Status: SHIPPED | OUTPATIENT
Start: 2024-08-30

## 2024-09-03 ENCOUNTER — TELEPHONE (OUTPATIENT)
Dept: FAMILY MEDICINE CLINIC | Facility: CLINIC | Age: 64
End: 2024-09-03
Payer: COMMERCIAL

## 2024-09-03 DIAGNOSIS — I10 HYPERTENSION, UNSPECIFIED TYPE: ICD-10-CM

## 2024-09-03 DIAGNOSIS — M10.9 GOUT, UNSPECIFIED CAUSE, UNSPECIFIED CHRONICITY, UNSPECIFIED SITE: ICD-10-CM

## 2024-09-03 DIAGNOSIS — E78.5 HYPERLIPIDEMIA, UNSPECIFIED HYPERLIPIDEMIA TYPE: ICD-10-CM

## 2024-09-03 RX ORDER — ALLOPURINOL 300 MG/1
300 TABLET ORAL DAILY
Qty: 90 TABLET | Refills: 0 | Status: SHIPPED | OUTPATIENT
Start: 2024-09-03 | End: 2024-09-05 | Stop reason: SDUPTHER

## 2024-09-03 RX ORDER — ATORVASTATIN CALCIUM 20 MG/1
20 TABLET, FILM COATED ORAL DAILY
Qty: 90 TABLET | Refills: 0 | Status: SHIPPED | OUTPATIENT
Start: 2024-09-03 | End: 2024-09-05 | Stop reason: SDUPTHER

## 2024-09-03 RX ORDER — BISOPROLOL FUMARATE 5 MG/1
TABLET, FILM COATED ORAL
Qty: 90 TABLET | Refills: 0 | Status: SHIPPED | OUTPATIENT
Start: 2024-09-03 | End: 2024-09-05 | Stop reason: SDUPTHER

## 2024-09-05 ENCOUNTER — OFFICE VISIT (OUTPATIENT)
Dept: FAMILY MEDICINE CLINIC | Facility: CLINIC | Age: 64
End: 2024-09-05
Payer: COMMERCIAL

## 2024-09-05 DIAGNOSIS — M10.9 GOUT, UNSPECIFIED CAUSE, UNSPECIFIED CHRONICITY, UNSPECIFIED SITE: ICD-10-CM

## 2024-09-05 DIAGNOSIS — E11.65 TYPE 2 DIABETES MELLITUS WITH HYPERGLYCEMIA, WITH LONG-TERM CURRENT USE OF INSULIN: Primary | ICD-10-CM

## 2024-09-05 DIAGNOSIS — E66.01 CLASS 2 SEVERE OBESITY WITH SERIOUS COMORBIDITY AND BODY MASS INDEX (BMI) OF 37.0 TO 37.9 IN ADULT, UNSPECIFIED OBESITY TYPE: ICD-10-CM

## 2024-09-05 DIAGNOSIS — E78.5 HYPERLIPIDEMIA, UNSPECIFIED HYPERLIPIDEMIA TYPE: ICD-10-CM

## 2024-09-05 DIAGNOSIS — Z79.4 TYPE 2 DIABETES MELLITUS WITH HYPERGLYCEMIA, WITH LONG-TERM CURRENT USE OF INSULIN: Primary | ICD-10-CM

## 2024-09-05 DIAGNOSIS — K21.9 GASTROESOPHAGEAL REFLUX DISEASE, UNSPECIFIED WHETHER ESOPHAGITIS PRESENT: ICD-10-CM

## 2024-09-05 DIAGNOSIS — D50.9 IRON DEFICIENCY ANEMIA, UNSPECIFIED IRON DEFICIENCY ANEMIA TYPE: ICD-10-CM

## 2024-09-05 DIAGNOSIS — I10 HYPERTENSION, UNSPECIFIED TYPE: ICD-10-CM

## 2024-09-05 PROCEDURE — 99214 OFFICE O/P EST MOD 30 MIN: CPT | Performed by: NURSE PRACTITIONER

## 2024-09-05 RX ORDER — METFORMIN HCL 500 MG
1000 TABLET, EXTENDED RELEASE 24 HR ORAL
Qty: 180 TABLET | Refills: 1 | Status: SHIPPED | OUTPATIENT
Start: 2024-09-05

## 2024-09-05 RX ORDER — AMLODIPINE AND VALSARTAN 10; 160 MG/1; MG/1
1 TABLET ORAL DAILY
COMMUNITY
Start: 2024-06-21

## 2024-09-05 RX ORDER — ATORVASTATIN CALCIUM 20 MG/1
20 TABLET, FILM COATED ORAL DAILY
Qty: 90 TABLET | Refills: 1 | Status: SHIPPED | OUTPATIENT
Start: 2024-09-05

## 2024-09-05 RX ORDER — OMEPRAZOLE 40 MG/1
40 CAPSULE, DELAYED RELEASE ORAL DAILY
Qty: 90 CAPSULE | Refills: 1 | Status: SHIPPED | OUTPATIENT
Start: 2024-09-05

## 2024-09-05 RX ORDER — ALLOPURINOL 300 MG/1
300 TABLET ORAL DAILY
Qty: 90 TABLET | Refills: 1 | Status: SHIPPED | OUTPATIENT
Start: 2024-09-05

## 2024-09-05 RX ORDER — SODIUM BICARBONATE 650 MG/1
650 TABLET ORAL 4 TIMES DAILY
COMMUNITY

## 2024-09-05 RX ORDER — BISOPROLOL FUMARATE 5 MG/1
TABLET, FILM COATED ORAL
Qty: 90 TABLET | Refills: 1 | Status: SHIPPED | OUTPATIENT
Start: 2024-09-05

## 2024-09-05 RX ORDER — SEMAGLUTIDE 0.68 MG/ML
0.5 INJECTION, SOLUTION SUBCUTANEOUS WEEKLY
Qty: 3 ML | Refills: 1 | Status: SHIPPED | OUTPATIENT
Start: 2024-09-05

## 2024-09-05 NOTE — ASSESSMENT & PLAN NOTE
Patient's (Body mass index is 37.44 kg/m².) indicates that they are morbidly/severely obese (BMI > 40 or > 35 with obesity - related health condition) with health conditions that include hypertension and diabetes mellitus . Weight is improving with treatment. BMI  is above average; BMI management plan is completed. We discussed portion control and increasing exercise.

## 2024-09-05 NOTE — PROGRESS NOTES
Chief Complaint  Diabetes, Gout, Hyperlipidemia, Hypertension, and Heartburn    SUBJECTIVE  Said Tyler presents to [facility] for three month follow up on Diabetes, Gout, Hyperlipidemia, Hypertension, and Heartburn.    Pt has an appt with ENT in [place] 9/13/24.     History of Present Illness  Past Medical History:   Diagnosis Date    Diabetes mellitus     GERD (gastroesophageal reflux disease)     Hyperlipidemia     Hypertension     Hyperthyroidism     Kidney stone       Family History   Problem Relation Age of Onset    Diabetes Mother     Hyperlipidemia Mother       Past Surgical History:   Procedure Laterality Date    INNER EAR SURGERY Left     KIDNEY STONE SURGERY          Current Outpatient Medications:     allopurinol (ZYLOPRIM) 300 MG tablet, Take 1 tablet by mouth Daily., Disp: 90 tablet, Rfl: 1    amLODIPine-valsartan (EXFORGE)  MG per tablet, Take 1 tablet by mouth Daily., Disp: , Rfl:     atorvastatin (LIPITOR) 20 MG tablet, Take 1 tablet by mouth Daily., Disp: 90 tablet, Rfl: 1    bisoprolol (ZEBeta) 5 MG tablet, TAKE 1 TABLET BY MOUTH EVERY DAY, Disp: 90 tablet, Rfl: 1    ferrous gluconate (FERGON) 324 MG tablet, TAKE 1 TABLET BY MOUTH EVERY DAY WITH BREAKFAST (Patient taking differently: Take 1 tablet by mouth Daily With Breakfast. Pt states he is taking 3 tabs a day.), Disp: 90 tablet, Rfl: 1    glucose monitor monitoring kit, Glucometer, strips, and lancets to test TID PRN, Disp: 90 each, Rfl: 1    hydroCHLOROthiazide 12.5 MG tablet, Take 1 tablet by mouth Daily., Disp: , Rfl:     insulin aspart (novoLOG FLEXPEN) 100 UNIT/ML solution pen-injector sc pen, Inject 20 Units under the skin into the appropriate area as directed 2 (Two) Times a Day With Meals for 180 days. Inject 10 units in the morning and 10 units in the evening., Disp: 12 mL, Rfl: 2    metFORMIN ER (GLUCOPHAGE-XR) 500 MG 24 hr tablet, Take 2 tablets by mouth Daily With Breakfast., Disp: 180  "tablet, Rfl: 1    omeprazole (priLOSEC) 40 MG capsule, Take 1 capsule by mouth Daily., Disp: 90 capsule, Rfl: 1    Semaglutide,0.25 or 0.5MG/DOS, (Ozempic, 0.25 or 0.5 MG/DOSE,) 2 MG/3ML solution pen-injector, Inject 0.5 mg under the skin into the appropriate area as directed 1 (One) Time Per Week., Disp: 3 mL, Rfl: 1    sodium bicarbonate 650 MG tablet, Take 1 tablet by mouth 4 (Four) Times a Day., Disp: , Rfl:     tamsulosin (FLOMAX) 0.4 MG capsule 24 hr capsule, Take 1 capsule by mouth Daily., Disp: , Rfl:     OBJECTIVE  Vital Signs:   /62   Pulse 98   Ht 165.1 cm (65\")   Wt 102 kg (225 lb)   SpO2 98%   BMI 37.44 kg/m²    Estimated body mass index is 37.44 kg/m² as calculated from the following:    Height as of this encounter: 165.1 cm (65\").    Weight as of this encounter: 102 kg (225 lb).     Wt Readings from Last 3 Encounters:   09/05/24 102 kg (225 lb)   08/12/24 102 kg (225 lb)   07/10/24 101 kg (222 lb)     BP Readings from Last 3 Encounters:   09/05/24 138/62   08/12/24 139/60   07/10/24 148/62       Physical Exam  Vitals reviewed.   Constitutional:       Appearance: Normal appearance. He is well-developed.   HENT:      Head: Normocephalic and atraumatic.      Right Ear: External ear normal.      Left Ear: External ear normal.   Eyes:      Conjunctiva/sclera: Conjunctivae normal.      Pupils: Pupils are equal, round, and reactive to light.   Cardiovascular:      Rate and Rhythm: Normal rate and regular rhythm.      Heart sounds: No murmur heard.     No friction rub. No gallop.   Pulmonary:      Effort: Pulmonary effort is normal.      Breath sounds: Normal breath sounds. No wheezing or rhonchi.   Skin:     General: Skin is warm and dry.   Neurological:      Mental Status: He is alert and oriented to person, place, and time.      Cranial Nerves: No cranial nerve deficit.   Psychiatric:         Mood and Affect: Mood and affect normal.         Behavior: Behavior normal.         Thought Content: " Thought content normal.         Judgment: Judgment normal.          Result Review    CMP          9/21/2023    12:05 3/5/2024    12:11 3/6/2024    11:15   CMP   Glucose 196  220  205    BUN 35  44  45    Creatinine 1.83  2.06  1.94    EGFR 40.7  35.1  37.7     31    Sodium 139  137  136    Potassium 5.0  5.1  4.9    Chloride 106  105  102    Calcium 9.6  10.0  9.4    Total Protein  7.4     Albumin 4.2  4.2  4.1    Globulin  3.2     Total Bilirubin  0.3     Alkaline Phosphatase  79     AST (SGOT)  14     ALT (SGPT)  14     Albumin/Globulin Ratio  1.3     BUN/Creatinine Ratio 19.1  21.4  23.2    Anion Gap 12.2  13.4  16.5      CBC          9/21/2023    12:05 3/5/2024    12:11   CBC   WBC 9.34  9.34    RBC 4.34  4.08    Hemoglobin 12.4  11.9    Hematocrit 37.5  36.0    MCV 86.4  88.2    MCH 28.6  29.2    MCHC 33.1  33.1    RDW 14.2  14.5    Platelets 208  206      Lipid Panel          3/5/2024    12:11   Lipid Panel   Total Cholesterol 161    Triglycerides 175    HDL Cholesterol 46    VLDL Cholesterol 30    LDL Cholesterol  85    LDL/HDL Ratio 1.74      TSH          3/5/2024    12:11   TSH   TSH 2.920      Most Recent A1C          6/6/2024    13:11   HGBA1C Most Recent   Hemoglobin A1C 7.70        A1C Last 3 Results          9/21/2023    12:05 3/5/2024    12:11 6/6/2024    13:11   HGBA1C Last 3 Results   Hemoglobin A1C 8.40  9.00  7.70        No Images in the past 120 days found..     The above data has been reviewed by ANETTE Galvan 09/05/2024 12:00 EDT.          Patient Care Team:  Brandi Puente APRN as PCP - General (Nurse Practitioner)  Jayro Lopez MD as Consulting Physician (Nephrology)  Kvng Braswell MD as Consulting Physician (Cardiology)            ASSESSMENT & PLAN    Diagnoses and all orders for this visit:    1. Type 2 diabetes mellitus with hyperglycemia, with long-term current use of insulin (Primary)  Assessment & Plan:  Improving with Ozempic, continue current  medications    Orders:  -     Comprehensive Metabolic Panel; Future  -     Lipid Panel; Future  -     Hemoglobin A1c; Future  -     Microalbumin / Creatinine Urine Ratio - Urine, Clean Catch; Future  -     CBC w AUTO Differential; Future  -     metFORMIN ER (GLUCOPHAGE-XR) 500 MG 24 hr tablet; Take 2 tablets by mouth Daily With Breakfast.  Dispense: 180 tablet; Refill: 1  -     Semaglutide,0.25 or 0.5MG/DOS, (Ozempic, 0.25 or 0.5 MG/DOSE,) 2 MG/3ML solution pen-injector; Inject 0.5 mg under the skin into the appropriate area as directed 1 (One) Time Per Week.  Dispense: 3 mL; Refill: 1    2. Gout, unspecified cause, unspecified chronicity, unspecified site  Overview:  Patient is currently stable on allopurinol 300 mg daily.    Orders:  -     allopurinol (ZYLOPRIM) 300 MG tablet; Take 1 tablet by mouth Daily.  Dispense: 90 tablet; Refill: 1    3. Hyperlipidemia, unspecified hyperlipidemia type  Overview:  Stable on atorvastatin, continue current medication    Orders:  -     atorvastatin (LIPITOR) 20 MG tablet; Take 1 tablet by mouth Daily.  Dispense: 90 tablet; Refill: 1    4. Hypertension, unspecified type  Assessment & Plan:  In fair control at present, continue current medication    Orders:  -     bisoprolol (ZEBeta) 5 MG tablet; TAKE 1 TABLET BY MOUTH EVERY DAY  Dispense: 90 tablet; Refill: 1    5. Gastroesophageal reflux disease, unspecified whether esophagitis present  Overview:  Doing well with omeprazole, continue current medication    Orders:  -     omeprazole (priLOSEC) 40 MG capsule; Take 1 capsule by mouth Daily.  Dispense: 90 capsule; Refill: 1    6. Iron deficiency anemia, unspecified iron deficiency anemia type  -     Iron Profile; Future    7. Class 2 severe obesity with serious comorbidity and body mass index (BMI) of 37.0 to 37.9 in adult, unspecified obesity type  Assessment & Plan:  Patient's (Body mass index is 37.44 kg/m².) indicates that they are morbidly/severely obese (BMI > 40 or > 35 with  obesity - related health condition) with health conditions that include hypertension and diabetes mellitus . Weight is improving with treatment. BMI  is above average; BMI management plan is completed. We discussed portion control and increasing exercise.            Tobacco Use: Low Risk  (9/6/2024)    Patient History     Smoking Tobacco Use: Never     Smokeless Tobacco Use: Never     Passive Exposure: Never       Follow Up     Return if symptoms worsen or fail to improve.        Patient was given instructions and counseling regarding his condition or for health maintenance advice. Please see specific information pulled into the AVS if appropriate.   I have reviewed information obtained and documented by others and I have confirmed the accuracy of this documented note.    ANETTE Galvan

## 2024-09-06 ENCOUNTER — LAB (OUTPATIENT)
Dept: LAB | Facility: HOSPITAL | Age: 64
End: 2024-09-06
Payer: COMMERCIAL

## 2024-09-06 ENCOUNTER — CLINICAL SUPPORT (OUTPATIENT)
Dept: FAMILY MEDICINE CLINIC | Facility: CLINIC | Age: 64
End: 2024-09-06
Payer: COMMERCIAL

## 2024-09-06 VITALS
HEIGHT: 65 IN | OXYGEN SATURATION: 98 % | BODY MASS INDEX: 37.49 KG/M2 | DIASTOLIC BLOOD PRESSURE: 62 MMHG | SYSTOLIC BLOOD PRESSURE: 138 MMHG | WEIGHT: 225 LBS | HEART RATE: 98 BPM

## 2024-09-06 DIAGNOSIS — Z79.4 TYPE 2 DIABETES MELLITUS WITH HYPERGLYCEMIA, WITH LONG-TERM CURRENT USE OF INSULIN: ICD-10-CM

## 2024-09-06 DIAGNOSIS — D50.9 IRON DEFICIENCY ANEMIA, UNSPECIFIED IRON DEFICIENCY ANEMIA TYPE: ICD-10-CM

## 2024-09-06 DIAGNOSIS — E11.65 TYPE 2 DIABETES MELLITUS WITH HYPERGLYCEMIA, WITH LONG-TERM CURRENT USE OF INSULIN: ICD-10-CM

## 2024-09-06 DIAGNOSIS — Z23 NEED FOR PNEUMOCOCCAL 20-VALENT CONJUGATE VACCINATION: Primary | ICD-10-CM

## 2024-09-06 LAB
ALBUMIN SERPL-MCNC: 4.2 G/DL (ref 3.5–5.2)
ALBUMIN UR-MCNC: 145.7 MG/DL
ALBUMIN/GLOB SERPL: 1.5 G/DL
ALP SERPL-CCNC: 71 U/L (ref 39–117)
ALT SERPL W P-5'-P-CCNC: 17 U/L (ref 1–41)
ANION GAP SERPL CALCULATED.3IONS-SCNC: 14.9 MMOL/L (ref 5–15)
AST SERPL-CCNC: 20 U/L (ref 1–40)
BASOPHILS # BLD AUTO: 0.07 10*3/MM3 (ref 0–0.2)
BASOPHILS NFR BLD AUTO: 0.8 % (ref 0–1.5)
BILIRUB SERPL-MCNC: 0.4 MG/DL (ref 0–1.2)
BUN SERPL-MCNC: 33 MG/DL (ref 8–23)
BUN/CREAT SERPL: 16.8 (ref 7–25)
CALCIUM SPEC-SCNC: 9.6 MG/DL (ref 8.6–10.5)
CHLORIDE SERPL-SCNC: 103 MMOL/L (ref 98–107)
CHOLEST SERPL-MCNC: 161 MG/DL (ref 0–200)
CO2 SERPL-SCNC: 20.1 MMOL/L (ref 22–29)
CREAT SERPL-MCNC: 1.96 MG/DL (ref 0.76–1.27)
CREAT UR-MCNC: 74.5 MG/DL
DEPRECATED RDW RBC AUTO: 46.1 FL (ref 37–54)
EGFRCR SERPLBLD CKD-EPI 2021: 37.2 ML/MIN/1.73
EOSINOPHIL # BLD AUTO: 0.41 10*3/MM3 (ref 0–0.4)
EOSINOPHIL NFR BLD AUTO: 4.5 % (ref 0.3–6.2)
ERYTHROCYTE [DISTWIDTH] IN BLOOD BY AUTOMATED COUNT: 14.2 % (ref 12.3–15.4)
GLOBULIN UR ELPH-MCNC: 2.8 GM/DL
GLUCOSE SERPL-MCNC: 176 MG/DL (ref 65–99)
HBA1C MFR BLD: 8.2 % (ref 4.8–5.6)
HCT VFR BLD AUTO: 39.5 % (ref 37.5–51)
HDLC SERPL-MCNC: 42 MG/DL (ref 40–60)
HGB BLD-MCNC: 12.7 G/DL (ref 13–17.7)
IMM GRANULOCYTES # BLD AUTO: 0.12 10*3/MM3 (ref 0–0.05)
IMM GRANULOCYTES NFR BLD AUTO: 1.3 % (ref 0–0.5)
IRON 24H UR-MRATE: 66 MCG/DL (ref 59–158)
IRON SATN MFR SERPL: 19 % (ref 20–50)
LDLC SERPL CALC-MCNC: 93 MG/DL (ref 0–100)
LDLC/HDLC SERPL: 2.12 {RATIO}
LYMPHOCYTES # BLD AUTO: 2.7 10*3/MM3 (ref 0.7–3.1)
LYMPHOCYTES NFR BLD AUTO: 29.4 % (ref 19.6–45.3)
MCH RBC QN AUTO: 28.9 PG (ref 26.6–33)
MCHC RBC AUTO-ENTMCNC: 32.2 G/DL (ref 31.5–35.7)
MCV RBC AUTO: 90 FL (ref 79–97)
MICROALBUMIN/CREAT UR: 1955.7 MG/G (ref 0–29)
MONOCYTES # BLD AUTO: 0.66 10*3/MM3 (ref 0.1–0.9)
MONOCYTES NFR BLD AUTO: 7.2 % (ref 5–12)
NEUTROPHILS NFR BLD AUTO: 5.21 10*3/MM3 (ref 1.7–7)
NEUTROPHILS NFR BLD AUTO: 56.8 % (ref 42.7–76)
NRBC BLD AUTO-RTO: 0 /100 WBC (ref 0–0.2)
PLATELET # BLD AUTO: 212 10*3/MM3 (ref 140–450)
PMV BLD AUTO: 12.6 FL (ref 6–12)
POTASSIUM SERPL-SCNC: 4.9 MMOL/L (ref 3.5–5.2)
PROT SERPL-MCNC: 7 G/DL (ref 6–8.5)
RBC # BLD AUTO: 4.39 10*6/MM3 (ref 4.14–5.8)
SODIUM SERPL-SCNC: 138 MMOL/L (ref 136–145)
TIBC SERPL-MCNC: 353 MCG/DL (ref 298–536)
TRANSFERRIN SERPL-MCNC: 237 MG/DL (ref 200–360)
TRIGL SERPL-MCNC: 149 MG/DL (ref 0–150)
VLDLC SERPL-MCNC: 26 MG/DL (ref 5–40)
WBC NRBC COR # BLD AUTO: 9.17 10*3/MM3 (ref 3.4–10.8)

## 2024-09-06 PROCEDURE — 82570 ASSAY OF URINE CREATININE: CPT

## 2024-09-06 PROCEDURE — 83036 HEMOGLOBIN GLYCOSYLATED A1C: CPT

## 2024-09-06 PROCEDURE — 80061 LIPID PANEL: CPT

## 2024-09-06 PROCEDURE — 90471 IMMUNIZATION ADMIN: CPT | Performed by: NURSE PRACTITIONER

## 2024-09-06 PROCEDURE — 85025 COMPLETE CBC W/AUTO DIFF WBC: CPT

## 2024-09-06 PROCEDURE — 84466 ASSAY OF TRANSFERRIN: CPT

## 2024-09-06 PROCEDURE — 83540 ASSAY OF IRON: CPT

## 2024-09-06 PROCEDURE — 90677 PCV20 VACCINE IM: CPT | Performed by: NURSE PRACTITIONER

## 2024-09-06 PROCEDURE — 80053 COMPREHEN METABOLIC PANEL: CPT

## 2024-09-06 PROCEDURE — 82043 UR ALBUMIN QUANTITATIVE: CPT

## 2024-09-06 PROCEDURE — 36415 COLL VENOUS BLD VENIPUNCTURE: CPT

## 2024-09-09 ENCOUNTER — TRANSCRIBE ORDERS (OUTPATIENT)
Dept: LAB | Facility: HOSPITAL | Age: 64
End: 2024-09-09
Payer: COMMERCIAL

## 2024-09-09 ENCOUNTER — LAB (OUTPATIENT)
Dept: LAB | Facility: HOSPITAL | Age: 64
End: 2024-09-09
Payer: COMMERCIAL

## 2024-09-09 DIAGNOSIS — E11.22 TYPE 2 DIABETES MELLITUS WITH DIABETIC CHRONIC KIDNEY DISEASE, UNSPECIFIED CKD STAGE, UNSPECIFIED WHETHER LONG TERM INSULIN USE: ICD-10-CM

## 2024-09-09 DIAGNOSIS — I12.9 HYPERTENSIVE NEPHROPATHY: ICD-10-CM

## 2024-09-09 DIAGNOSIS — N18.4 CHRONIC KIDNEY DISEASE, STAGE IV (SEVERE): Primary | ICD-10-CM

## 2024-09-09 DIAGNOSIS — N40.0 ENLARGED PROSTATE: ICD-10-CM

## 2024-09-09 DIAGNOSIS — N18.4 CHRONIC KIDNEY DISEASE, STAGE IV (SEVERE): ICD-10-CM

## 2024-09-09 LAB
ALBUMIN SERPL-MCNC: 4.2 G/DL (ref 3.5–5.2)
ALBUMIN UR-MCNC: 181.2 MG/DL
ANION GAP SERPL CALCULATED.3IONS-SCNC: 16.1 MMOL/L (ref 5–15)
BACTERIA UR QL AUTO: NORMAL /HPF
BASOPHILS # BLD AUTO: 0.08 10*3/MM3 (ref 0–0.2)
BASOPHILS NFR BLD AUTO: 0.9 % (ref 0–1.5)
BILIRUB UR QL STRIP: NEGATIVE
BUN SERPL-MCNC: 31 MG/DL (ref 8–23)
BUN/CREAT SERPL: 15 (ref 7–25)
CALCIUM SPEC-SCNC: 9.7 MG/DL (ref 8.6–10.5)
CHLORIDE SERPL-SCNC: 102 MMOL/L (ref 98–107)
CLARITY UR: CLEAR
CO2 SERPL-SCNC: 18.9 MMOL/L (ref 22–29)
COLOR UR: YELLOW
CREAT SERPL-MCNC: 2.07 MG/DL (ref 0.76–1.27)
CREAT UR-MCNC: 71.3 MG/DL
CREAT UR-MCNC: 73.8 MG/DL
DEPRECATED RDW RBC AUTO: 44.8 FL (ref 37–54)
EGFRCR SERPLBLD CKD-EPI 2021: 34.9 ML/MIN/1.73
EOSINOPHIL # BLD AUTO: 0.34 10*3/MM3 (ref 0–0.4)
EOSINOPHIL NFR BLD AUTO: 3.7 % (ref 0.3–6.2)
ERYTHROCYTE [DISTWIDTH] IN BLOOD BY AUTOMATED COUNT: 14 % (ref 12.3–15.4)
GLUCOSE SERPL-MCNC: 204 MG/DL (ref 65–99)
GLUCOSE UR STRIP-MCNC: NEGATIVE MG/DL
HCT VFR BLD AUTO: 38.5 % (ref 37.5–51)
HGB BLD-MCNC: 12.8 G/DL (ref 13–17.7)
HGB UR QL STRIP.AUTO: NEGATIVE
HYALINE CASTS UR QL AUTO: NORMAL /LPF
IMM GRANULOCYTES # BLD AUTO: 0.1 10*3/MM3 (ref 0–0.05)
IMM GRANULOCYTES NFR BLD AUTO: 1.1 % (ref 0–0.5)
KETONES UR QL STRIP: NEGATIVE
LEUKOCYTE ESTERASE UR QL STRIP.AUTO: NEGATIVE
LYMPHOCYTES # BLD AUTO: 2.49 10*3/MM3 (ref 0.7–3.1)
LYMPHOCYTES NFR BLD AUTO: 26.9 % (ref 19.6–45.3)
MCH RBC QN AUTO: 29.5 PG (ref 26.6–33)
MCHC RBC AUTO-ENTMCNC: 33.2 G/DL (ref 31.5–35.7)
MCV RBC AUTO: 88.7 FL (ref 79–97)
MICROALBUMIN/CREAT UR: 2541.4 MG/G (ref 0–29)
MONOCYTES # BLD AUTO: 0.6 10*3/MM3 (ref 0.1–0.9)
MONOCYTES NFR BLD AUTO: 6.5 % (ref 5–12)
NEUTROPHILS NFR BLD AUTO: 5.63 10*3/MM3 (ref 1.7–7)
NEUTROPHILS NFR BLD AUTO: 60.9 % (ref 42.7–76)
NITRITE UR QL STRIP: NEGATIVE
NRBC BLD AUTO-RTO: 0 /100 WBC (ref 0–0.2)
PH UR STRIP.AUTO: 6 [PH] (ref 5–8)
PHOSPHATE SERPL-MCNC: 3.4 MG/DL (ref 2.5–4.5)
PLATELET # BLD AUTO: 210 10*3/MM3 (ref 140–450)
PMV BLD AUTO: 12.5 FL (ref 6–12)
POTASSIUM SERPL-SCNC: 4.9 MMOL/L (ref 3.5–5.2)
PROT ?TM UR-MCNC: 285.5 MG/DL
PROT UR QL STRIP: ABNORMAL
PROT/CREAT UR: 3.87 MG/G{CREAT}
RBC # BLD AUTO: 4.34 10*6/MM3 (ref 4.14–5.8)
RBC # UR STRIP: NORMAL /HPF
REF LAB TEST METHOD: NORMAL
SODIUM SERPL-SCNC: 137 MMOL/L (ref 136–145)
SP GR UR STRIP: 1.02 (ref 1–1.03)
SQUAMOUS #/AREA URNS HPF: NORMAL /HPF
UROBILINOGEN UR QL STRIP: ABNORMAL
WBC # UR STRIP: NORMAL /HPF
WBC NRBC COR # BLD AUTO: 9.24 10*3/MM3 (ref 3.4–10.8)

## 2024-09-09 PROCEDURE — 36415 COLL VENOUS BLD VENIPUNCTURE: CPT

## 2024-09-09 PROCEDURE — 85025 COMPLETE CBC W/AUTO DIFF WBC: CPT

## 2024-09-09 PROCEDURE — 82570 ASSAY OF URINE CREATININE: CPT

## 2024-09-09 PROCEDURE — 82043 UR ALBUMIN QUANTITATIVE: CPT

## 2024-09-09 PROCEDURE — 80069 RENAL FUNCTION PANEL: CPT

## 2024-09-09 PROCEDURE — 84156 ASSAY OF PROTEIN URINE: CPT

## 2024-09-09 PROCEDURE — 81001 URINALYSIS AUTO W/SCOPE: CPT

## 2024-09-10 DIAGNOSIS — E11.65 TYPE 2 DIABETES MELLITUS WITH HYPERGLYCEMIA, WITH LONG-TERM CURRENT USE OF INSULIN: ICD-10-CM

## 2024-09-10 DIAGNOSIS — Z79.4 TYPE 2 DIABETES MELLITUS WITH HYPERGLYCEMIA, WITH LONG-TERM CURRENT USE OF INSULIN: ICD-10-CM

## 2024-10-04 ENCOUNTER — TELEPHONE (OUTPATIENT)
Dept: FAMILY MEDICINE CLINIC | Facility: CLINIC | Age: 64
End: 2024-10-04
Payer: COMMERCIAL

## 2024-10-04 NOTE — TELEPHONE ENCOUNTER
[facility] regarding the patient's medication and a PA. You can contact them back at [phone]

## 2024-10-04 NOTE — TELEPHONE ENCOUNTER
Per Cover my meds : Prior Authorization Not Required    Called pharmacy to confirm, waiting for call back.

## 2024-11-21 ENCOUNTER — OFFICE VISIT (OUTPATIENT)
Dept: CARDIOLOGY | Facility: CLINIC | Age: 64
End: 2024-11-21
Payer: COMMERCIAL

## 2024-11-21 VITALS
SYSTOLIC BLOOD PRESSURE: 153 MMHG | HEART RATE: 69 BPM | HEIGHT: 65 IN | BODY MASS INDEX: 37.65 KG/M2 | WEIGHT: 226 LBS | DIASTOLIC BLOOD PRESSURE: 87 MMHG

## 2024-11-21 DIAGNOSIS — E78.2 MIXED HYPERLIPIDEMIA: ICD-10-CM

## 2024-11-21 DIAGNOSIS — I51.89 DIASTOLIC DYSFUNCTION: ICD-10-CM

## 2024-11-21 DIAGNOSIS — I25.10 CORONARY ARTERY DISEASE INVOLVING NATIVE CORONARY ARTERY OF NATIVE HEART WITHOUT ANGINA PECTORIS: ICD-10-CM

## 2024-11-21 DIAGNOSIS — G47.33 OSA (OBSTRUCTIVE SLEEP APNEA): ICD-10-CM

## 2024-11-21 DIAGNOSIS — N18.32 STAGE 3B CHRONIC KIDNEY DISEASE: ICD-10-CM

## 2024-11-21 DIAGNOSIS — I10 PRIMARY HYPERTENSION: Primary | ICD-10-CM

## 2024-11-21 NOTE — PROGRESS NOTES
CARDIOLOGY FOLLOW-UP PROGRESS NOTE        Chief Complaint  Follow-up, Hypertension, and Hyperlipidemia    Subjective            Said Tyler presents to [facility]  History of Present Illness      The patient is cardiac wise stable. He denies angina or dyspnea. His rechecked BP was 140/89 mm Hg. He had a recent unremarkable nuclear stress test. His LV systolic function is preserved by ECHO. He has mild LVH and mild diastolic dysfunction. His renal function is stable with GFR of 35.        Past History:    Medical History:  Past Medical History:   Diagnosis Date    Diabetes mellitus     GERD (gastroesophageal reflux disease)     Hyperlipidemia     Hypertension     Hyperthyroidism     Kidney stone        Surgical History: has a past surgical history that includes Kidney stone surgery and Inner ear surgery (Left).     Family History: family history includes Diabetes in his mother; Hyperlipidemia in his mother.     Social History: reports that he has never smoked. He has never been exposed to tobacco smoke. He has never used smokeless tobacco. He reports that he does not drink alcohol and does not use drugs.    Allergies: Patient has no known allergies.    Current Outpatient Medications on File Prior to Visit   Medication Sig    allopurinol (ZYLOPRIM) 300 MG tablet Take 1 tablet by mouth Daily.    amLODIPine-valsartan (EXFORGE)  MG per tablet Take 1 tablet by mouth Daily.    atorvastatin (LIPITOR) 20 MG tablet Take 1 tablet by mouth Daily.    bisoprolol (ZEBeta) 5 MG tablet TAKE 1 TABLET BY MOUTH EVERY DAY    ferrous gluconate (FERGON) 324 MG tablet TAKE 1 TABLET BY MOUTH EVERY DAY WITH BREAKFAST (Patient taking differently: Take 1 tablet by mouth Daily With Breakfast. Pt states he is taking 3 tabs a day.)    glucose monitor monitoring kit Glucometer, strips, and lancets to test TID PRN    hydroCHLOROthiazide 12.5 MG tablet Take 1 tablet by mouth Daily.    insulin aspart (novoLOG  "FLEXPEN) 100 UNIT/ML solution pen-injector sc pen Inject 20 Units under the skin into the appropriate area as directed 2 (Two) Times a Day With Meals for 180 days. Inject 10 units in the morning and 10 units in the evening.    metFORMIN ER (GLUCOPHAGE-XR) 500 MG 24 hr tablet Take 2 tablets by mouth Daily With Breakfast.    omeprazole (priLOSEC) 40 MG capsule Take 1 capsule by mouth Daily.    Semaglutide, 1 MG/DOSE, (OZEMPIC) 2 MG/1.5ML solution pen-injector Inject 1 mg under the skin into the appropriate area as directed 1 (One) Time Per Week.    sodium bicarbonate 650 MG tablet Take 1 tablet by mouth 4 (Four) Times a Day.    tamsulosin (FLOMAX) 0.4 MG capsule 24 hr capsule Take 1 capsule by mouth Daily.     No current facility-administered medications on file prior to visit.          Review of Systems : All systems were reviewed and negative.     Objective     /87 (BP Location: Left arm)   Pulse 69   Ht 165.1 cm (65\")   Wt 103 kg (226 lb)   BMI 37.61 kg/m²       Physical Exam    General : Alert, awake, no acute distress  Neck : Supple, no carotid bruit, no jugular venous distention  CVS : Regular rate and rhythm, no murmur, rubs or gallops  Lungs: Clear to auscultation bilaterally, no crackles or rhonchi  Abdomen: Soft, nontender, bowel sounds heard in all 4 quadrants  Extremities: Warm, well-perfused, no pedal edema  Neurologic. No apparent motor deficits.     Result Review :     The following data was reviewed by: Kvng Mcpherson MD on 11/21/2024:    CMP          3/6/2024    11:15 9/6/2024    08:39 9/9/2024    11:50   CMP   Glucose 205  176  204    BUN 45  33  31    Creatinine 1.94  1.96  2.07    EGFR 37.7     31  37.2  34.9    Sodium 136  138  137    Potassium 4.9  4.9  4.9    Chloride 102  103  102    Calcium 9.4  9.6  9.7    Total Protein  7.0     Albumin 4.1  4.2  4.2    Globulin  2.8     Total Bilirubin  0.4     Alkaline Phosphatase  71     AST (SGOT)  20     ALT (SGPT)  17     Albumin/Globulin " Ratio  1.5     BUN/Creatinine Ratio 23.2  16.8  15.0    Anion Gap 16.5  14.9  16.1      CBC          3/5/2024    12:11 9/6/2024    08:39 9/9/2024    11:50   CBC   WBC 9.34  9.17  9.24    RBC 4.08  4.39  4.34    Hemoglobin 11.9  12.7  12.8    Hematocrit 36.0  39.5  38.5    MCV 88.2  90.0  88.7    MCH 29.2  28.9  29.5    MCHC 33.1  32.2  33.2    RDW 14.5  14.2  14.0    Platelets 206  212  210      TSH          3/5/2024    12:11   TSH   TSH 2.920      Lipid Panel          3/5/2024    12:11 9/6/2024    08:39   Lipid Panel   Total Cholesterol 161  161    Triglycerides 175  149    HDL Cholesterol 46  42    VLDL Cholesterol 30  26    LDL Cholesterol  85  93    LDL/HDL Ratio 1.74  2.12           Data reviewed: Cardiology studies        Results for orders placed during the hospital encounter of 04/29/24    Adult Transthoracic Echo Complete W/ Cont if Necessary Per Protocol    Interpretation Summary    Left ventricular systolic function is normal. Calculated left ventricular EF = 69.5%    Left ventricular wall thickness is consistent with mild concentric hypertrophy.    Left ventricular diastolic function is consistent with (grade I) impaired relaxation.    The left atrial cavity is mildly dilated.      Results for orders placed during the hospital encounter of 04/29/24    Stress Test With Myocardial Perfusion One Day    Interpretation Summary    Myocardial perfusion imaging indicates a normal myocardial perfusion study with no evidence of ischemia.    Left ventricular ejection fraction is normal (Calculated EF = 50%).    There is a an small fixed perfusion defect in the inferior wall suggestive of attenuation. No clear evidence of ischemia was observed..    Findings consistent with a normal ECG stress test.               Assessment and Plan        Diagnoses and all orders for this visit:    1. Primary hypertension (Primary)    2. Diastolic dysfunction    3. Coronary artery disease involving native coronary artery of native  heart without angina pectoris    4. CLAUDINE (obstructive sleep apnea)    5. Mixed hyperlipidemia    6. Stage 3b chronic kidney disease          I would continue with current medical regimen for goal BP < 130/80 mmHg. Advised to increase in physical activities by walking at least 45 minutes per day, 6 times per week. Advised to lose wt. Continue with statin therapy.Will re-evaluate in 6 months.     Follow Up     Return in about 6 months (around 5/21/2025) for [facility].    Patient was given instructions and counseling regarding his condition or for health maintenance advice. Please see specific information pulled into the AVS if appropriate.

## 2024-12-05 ENCOUNTER — OFFICE VISIT (OUTPATIENT)
Dept: FAMILY MEDICINE CLINIC | Facility: CLINIC | Age: 64
End: 2024-12-05
Payer: COMMERCIAL

## 2024-12-05 ENCOUNTER — LAB (OUTPATIENT)
Dept: LAB | Facility: HOSPITAL | Age: 64
End: 2024-12-05
Payer: COMMERCIAL

## 2024-12-05 VITALS
HEART RATE: 66 BPM | BODY MASS INDEX: 38.15 KG/M2 | SYSTOLIC BLOOD PRESSURE: 140 MMHG | DIASTOLIC BLOOD PRESSURE: 60 MMHG | OXYGEN SATURATION: 98 % | HEIGHT: 65 IN | WEIGHT: 229 LBS

## 2024-12-05 DIAGNOSIS — I10 HYPERTENSION, UNSPECIFIED TYPE: ICD-10-CM

## 2024-12-05 DIAGNOSIS — Z79.4 TYPE 2 DIABETES MELLITUS WITH HYPERGLYCEMIA, WITH LONG-TERM CURRENT USE OF INSULIN: ICD-10-CM

## 2024-12-05 DIAGNOSIS — Z79.4 TYPE 2 DIABETES MELLITUS WITH HYPERGLYCEMIA, WITH LONG-TERM CURRENT USE OF INSULIN: Primary | ICD-10-CM

## 2024-12-05 DIAGNOSIS — E11.65 TYPE 2 DIABETES MELLITUS WITH HYPERGLYCEMIA, WITH LONG-TERM CURRENT USE OF INSULIN: Primary | ICD-10-CM

## 2024-12-05 DIAGNOSIS — E11.65 TYPE 2 DIABETES MELLITUS WITH HYPERGLYCEMIA, WITH LONG-TERM CURRENT USE OF INSULIN: ICD-10-CM

## 2024-12-05 LAB
BASOPHILS # BLD AUTO: 0.1 10*3/MM3 (ref 0–0.2)
BASOPHILS NFR BLD AUTO: 1 % (ref 0–1.5)
DEPRECATED RDW RBC AUTO: 42.7 FL (ref 37–54)
EOSINOPHIL # BLD AUTO: 0.41 10*3/MM3 (ref 0–0.4)
EOSINOPHIL NFR BLD AUTO: 4.1 % (ref 0.3–6.2)
ERYTHROCYTE [DISTWIDTH] IN BLOOD BY AUTOMATED COUNT: 13.3 % (ref 12.3–15.4)
HCT VFR BLD AUTO: 39.8 % (ref 37.5–51)
HGB BLD-MCNC: 13.7 G/DL (ref 13–17.7)
IMM GRANULOCYTES # BLD AUTO: 0.13 10*3/MM3 (ref 0–0.05)
IMM GRANULOCYTES NFR BLD AUTO: 1.3 % (ref 0–0.5)
LYMPHOCYTES # BLD AUTO: 2.54 10*3/MM3 (ref 0.7–3.1)
LYMPHOCYTES NFR BLD AUTO: 25.2 % (ref 19.6–45.3)
MCH RBC QN AUTO: 30.6 PG (ref 26.6–33)
MCHC RBC AUTO-ENTMCNC: 34.4 G/DL (ref 31.5–35.7)
MCV RBC AUTO: 88.8 FL (ref 79–97)
MONOCYTES # BLD AUTO: 0.73 10*3/MM3 (ref 0.1–0.9)
MONOCYTES NFR BLD AUTO: 7.2 % (ref 5–12)
NEUTROPHILS NFR BLD AUTO: 6.16 10*3/MM3 (ref 1.7–7)
NEUTROPHILS NFR BLD AUTO: 61.2 % (ref 42.7–76)
NRBC BLD AUTO-RTO: 0 /100 WBC (ref 0–0.2)
PLATELET # BLD AUTO: 228 10*3/MM3 (ref 140–450)
PMV BLD AUTO: 11.8 FL (ref 6–12)
RBC # BLD AUTO: 4.48 10*6/MM3 (ref 4.14–5.8)
WBC NRBC COR # BLD AUTO: 10.07 10*3/MM3 (ref 3.4–10.8)

## 2024-12-05 PROCEDURE — 99214 OFFICE O/P EST MOD 30 MIN: CPT | Performed by: NURSE PRACTITIONER

## 2024-12-05 PROCEDURE — 84466 ASSAY OF TRANSFERRIN: CPT

## 2024-12-05 PROCEDURE — 82607 VITAMIN B-12: CPT

## 2024-12-05 PROCEDURE — 83540 ASSAY OF IRON: CPT

## 2024-12-05 PROCEDURE — 36415 COLL VENOUS BLD VENIPUNCTURE: CPT

## 2024-12-05 PROCEDURE — 83036 HEMOGLOBIN GLYCOSYLATED A1C: CPT

## 2024-12-05 PROCEDURE — 85025 COMPLETE CBC W/AUTO DIFF WBC: CPT

## 2024-12-05 NOTE — PROGRESS NOTES
Chief Complaint  Diabetes    SUBJECTIVE  Said Tyler presents to [facility] for three month follow up on DM. Pt states the pharmacy did not fill the Ozempic, not sure why. Insurance did approve it, states the pharmacy kept telling him to reach out to us and his insurance, states he did finally speak to his insurance who told him that it was approved, however he never received the medication.     History of Present Illness  Past Medical History:   Diagnosis Date    Diabetes mellitus     GERD (gastroesophageal reflux disease)     Hyperlipidemia     Hypertension     Hyperthyroidism     Kidney stone       Family History   Problem Relation Age of Onset    Diabetes Mother     Hyperlipidemia Mother       Past Surgical History:   Procedure Laterality Date    INNER EAR SURGERY Left     KIDNEY STONE SURGERY          Current Outpatient Medications:     allopurinol (ZYLOPRIM) 300 MG tablet, Take 1 tablet by mouth Daily., Disp: 90 tablet, Rfl: 1    amLODIPine-valsartan (EXFORGE)  MG per tablet, Take 1 tablet by mouth Daily., Disp: , Rfl:     atorvastatin (LIPITOR) 20 MG tablet, Take 1 tablet by mouth Daily., Disp: 90 tablet, Rfl: 1    bisoprolol (ZEBeta) 5 MG tablet, TAKE 1 TABLET BY MOUTH EVERY DAY, Disp: 90 tablet, Rfl: 1    ferrous gluconate (FERGON) 324 MG tablet, TAKE 1 TABLET BY MOUTH EVERY DAY WITH BREAKFAST (Patient taking differently: Take 1 tablet by mouth Daily With Breakfast. Pt states he is taking 3 tabs a day.), Disp: 90 tablet, Rfl: 1    glucose monitor monitoring kit, Glucometer, strips, and lancets to test TID PRN, Disp: 90 each, Rfl: 1    insulin aspart (novoLOG FLEXPEN) 100 UNIT/ML solution pen-injector sc pen, Inject 20 Units under the skin into the appropriate area as directed 2 (Two) Times a Day With Meals for 180 days. Inject 10 units in the morning and 10 units in the evening., Disp: 12 mL, Rfl: 2    metFORMIN ER (GLUCOPHAGE-XR) 500 MG 24 hr tablet, Take 2 tablets  "by mouth Daily With Breakfast., Disp: 180 tablet, Rfl: 1    omeprazole (priLOSEC) 40 MG capsule, Take 1 capsule by mouth Daily., Disp: 90 capsule, Rfl: 1    sodium bicarbonate 650 MG tablet, Take 1 tablet by mouth 4 (Four) Times a Day., Disp: , Rfl:     tamsulosin (FLOMAX) 0.4 MG capsule 24 hr capsule, Take 1 capsule by mouth Daily., Disp: , Rfl:     hydroCHLOROthiazide 12.5 MG tablet, Take 1 tablet by mouth Daily. (Patient not taking: Reported on 12/5/2024), Disp: , Rfl:     Semaglutide,0.25 or 0.5MG/DOS, (OZEMPIC) 2 MG/1.5ML solution pen-injector, 0.25mg weekly for 4 weeks, then 0.5mg weekly thereafter., Disp: 4.5 mL, Rfl: 1    OBJECTIVE  Vital Signs:   /60   Pulse 66   Ht 165.1 cm (65\")   Wt 104 kg (229 lb)   SpO2 98%   BMI 38.11 kg/m²    Estimated body mass index is 38.11 kg/m² as calculated from the following:    Height as of this encounter: 165.1 cm (65\").    Weight as of this encounter: 104 kg (229 lb).     Wt Readings from Last 3 Encounters:   12/05/24 104 kg (229 lb)   11/21/24 103 kg (226 lb)   09/05/24 102 kg (225 lb)     BP Readings from Last 3 Encounters:   12/05/24 140/60   11/21/24 153/87   09/05/24 138/62       Physical Exam  Vitals reviewed.   Constitutional:       Appearance: Normal appearance. He is well-developed.   HENT:      Head: Normocephalic and atraumatic.      Right Ear: External ear normal.      Left Ear: External ear normal.   Eyes:      Conjunctiva/sclera: Conjunctivae normal.      Pupils: Pupils are equal, round, and reactive to light.   Cardiovascular:      Rate and Rhythm: Normal rate and regular rhythm.      Heart sounds: No murmur heard.     No friction rub. No gallop.   Pulmonary:      Effort: Pulmonary effort is normal.      Breath sounds: Normal breath sounds. No wheezing or rhonchi.   Skin:     General: Skin is warm and dry.   Neurological:      Mental Status: He is alert and oriented to person, place, and time.      Cranial Nerves: No cranial nerve deficit. "   Psychiatric:         Mood and Affect: Mood and affect normal.         Behavior: Behavior normal.         Thought Content: Thought content normal.         Judgment: Judgment normal.          Result Review    CMP          3/6/2024    11:15 9/6/2024    08:39 9/9/2024    11:50   CMP   Glucose 205  176  204    BUN 45  33  31    Creatinine 1.94  1.96  2.07    EGFR 37.7     31  37.2  34.9    Sodium 136  138  137    Potassium 4.9  4.9  4.9    Chloride 102  103  102    Calcium 9.4  9.6  9.7    Total Protein  7.0     Albumin 4.1  4.2  4.2    Globulin  2.8     Total Bilirubin  0.4     Alkaline Phosphatase  71     AST (SGOT)  20     ALT (SGPT)  17     Albumin/Globulin Ratio  1.5     BUN/Creatinine Ratio 23.2  16.8  15.0    Anion Gap 16.5  14.9  16.1      CBC          3/5/2024    12:11 9/6/2024    08:39 9/9/2024    11:50   CBC   WBC 9.34  9.17  9.24    RBC 4.08  4.39  4.34    Hemoglobin 11.9  12.7  12.8    Hematocrit 36.0  39.5  38.5    MCV 88.2  90.0  88.7    MCH 29.2  28.9  29.5    MCHC 33.1  32.2  33.2    RDW 14.5  14.2  14.0    Platelets 206  212  210      Lipid Panel          3/5/2024    12:11 9/6/2024    08:39   Lipid Panel   Total Cholesterol 161  161    Triglycerides 175  149    HDL Cholesterol 46  42    VLDL Cholesterol 30  26    LDL Cholesterol  85  93    LDL/HDL Ratio 1.74  2.12      TSH          3/5/2024    12:11   TSH   TSH 2.920      A1C Last 3 Results          3/5/2024    12:11 6/6/2024    13:11 9/6/2024    08:39   HGBA1C Last 3 Results   Hemoglobin A1C 9.00  7.70  8.20        No Images in the past 120 days found..     The above data has been reviewed by ANETTE Galvan 12/05/2024 11:09 EST.          Patient Care Team:  Brandi Puente APRN as PCP - General (Nurse Practitioner)  Jayro Lopez MD as Consulting Physician (Nephrology)  Higgins Sing, Kvng, MD as Consulting Physician (Cardiology)            ASSESSMENT & PLAN    Diagnoses and all orders for this visit:    1. Type 2 diabetes mellitus  with hyperglycemia, with long-term current use of insulin (Primary)  Assessment & Plan:  Discussed with patient that he likely had the prescription available at the pharmacy and just did not pick it up, discussed if he has any difficulty in picking up his prescriptions today he is to come to the office and let us know.  As patient reports he has been off of Ozempic for almost 3 months we are going to start back at starting dose, he will start with 0.25 mg once weekly for 4 weeks and then increase to 0.5 mg weekly thereafter, we will follow-up in 3 months for reevaluation.  Discussed diet control,    Orders:  -     Hemoglobin A1c; Future  -     Semaglutide,0.25 or 0.5MG/DOS, (OZEMPIC) 2 MG/1.5ML solution pen-injector; 0.25mg weekly for 4 weeks, then 0.5mg weekly thereafter.  Dispense: 4.5 mL; Refill: 1  -     CBC w AUTO Differential; Future  -     Iron Profile; Future  -     Vitamin B12; Future    2. Hypertension, unspecified type  Assessment & Plan:  Blood pressure fairly well-controlled at present, and hoping that restarting Ozempic will help with some weight loss as patient has had some weight gain since stopping.  If blood pressure does not improve with some weight loss at our 3-month follow-up we may consider dose adjustment.           Tobacco Use: Low Risk  (12/5/2024)    Patient History     Smoking Tobacco Use: Never     Smokeless Tobacco Use: Never     Passive Exposure: Never       Follow Up     Return in about 3 months (around 3/5/2025), or if symptoms worsen or fail to improve.        Patient was given instructions and counseling regarding his condition or for health maintenance advice. Please see specific information pulled into the AVS if appropriate.   I have reviewed information obtained and documented by others and I have confirmed the accuracy of this documented note.    ANETTE Galvan

## 2024-12-05 NOTE — ASSESSMENT & PLAN NOTE
Discussed with patient that he likely had the prescription available at the pharmacy and just did not pick it up, discussed if he has any difficulty in picking up his prescriptions today he is to come to the office and let us know.  As patient reports he has been off of Ozempic for almost 3 months we are going to start back at starting dose, he will start with 0.25 mg once weekly for 4 weeks and then increase to 0.5 mg weekly thereafter, we will follow-up in 3 months for reevaluation.  Discussed diet control,

## 2024-12-05 NOTE — ASSESSMENT & PLAN NOTE
Blood pressure fairly well-controlled at present, and hoping that restarting Ozempic will help with some weight loss as patient has had some weight gain since stopping.  If blood pressure does not improve with some weight loss at our 3-month follow-up we may consider dose adjustment.

## 2024-12-06 LAB
HBA1C MFR BLD: 8.2 % (ref 4.8–5.6)
IRON 24H UR-MRATE: 81 MCG/DL (ref 59–158)
IRON SATN MFR SERPL: 21 % (ref 20–50)
TIBC SERPL-MCNC: 380 MCG/DL (ref 298–536)
TRANSFERRIN SERPL-MCNC: 255 MG/DL (ref 200–360)
VIT B12 BLD-MCNC: 661 PG/ML (ref 211–946)

## 2024-12-10 DIAGNOSIS — E11.65 TYPE 2 DIABETES MELLITUS WITH HYPERGLYCEMIA, WITH LONG-TERM CURRENT USE OF INSULIN: Primary | ICD-10-CM

## 2024-12-10 DIAGNOSIS — Z79.4 TYPE 2 DIABETES MELLITUS WITH HYPERGLYCEMIA, WITH LONG-TERM CURRENT USE OF INSULIN: Primary | ICD-10-CM

## 2024-12-13 ENCOUNTER — LAB (OUTPATIENT)
Dept: LAB | Facility: HOSPITAL | Age: 64
End: 2024-12-13
Payer: COMMERCIAL

## 2024-12-13 DIAGNOSIS — E11.65 TYPE 2 DIABETES MELLITUS WITH HYPERGLYCEMIA, WITH LONG-TERM CURRENT USE OF INSULIN: ICD-10-CM

## 2024-12-13 DIAGNOSIS — Z79.4 TYPE 2 DIABETES MELLITUS WITH HYPERGLYCEMIA, WITH LONG-TERM CURRENT USE OF INSULIN: ICD-10-CM

## 2024-12-13 LAB — HBA1C MFR BLD: 8.3 % (ref 4.8–5.6)

## 2024-12-13 PROCEDURE — 36415 COLL VENOUS BLD VENIPUNCTURE: CPT

## 2024-12-13 PROCEDURE — 83036 HEMOGLOBIN GLYCOSYLATED A1C: CPT

## 2024-12-16 DIAGNOSIS — Z79.4 TYPE 2 DIABETES MELLITUS WITH HYPERGLYCEMIA, WITH LONG-TERM CURRENT USE OF INSULIN: Primary | ICD-10-CM

## 2024-12-16 DIAGNOSIS — E11.65 TYPE 2 DIABETES MELLITUS WITH HYPERGLYCEMIA, WITH LONG-TERM CURRENT USE OF INSULIN: Primary | ICD-10-CM

## 2025-02-27 DIAGNOSIS — E11.65 TYPE 2 DIABETES MELLITUS WITH HYPERGLYCEMIA, WITH LONG-TERM CURRENT USE OF INSULIN: ICD-10-CM

## 2025-02-27 DIAGNOSIS — Z79.4 TYPE 2 DIABETES MELLITUS WITH HYPERGLYCEMIA, WITH LONG-TERM CURRENT USE OF INSULIN: ICD-10-CM

## 2025-02-27 RX ORDER — METFORMIN HYDROCHLORIDE 500 MG/1
1000 TABLET, EXTENDED RELEASE ORAL
Qty: 180 TABLET | Refills: 1 | Status: SHIPPED | OUTPATIENT
Start: 2025-02-27

## 2025-02-27 RX ORDER — AMLODIPINE AND VALSARTAN 10; 160 MG/1; MG/1
1 TABLET ORAL DAILY
Qty: 90 TABLET | Refills: 1 | Status: SHIPPED | OUTPATIENT
Start: 2025-02-27

## 2025-03-04 ENCOUNTER — LAB (OUTPATIENT)
Dept: LAB | Facility: HOSPITAL | Age: 65
End: 2025-03-04
Payer: COMMERCIAL

## 2025-03-04 DIAGNOSIS — Z79.4 TYPE 2 DIABETES MELLITUS WITH HYPERGLYCEMIA, WITH LONG-TERM CURRENT USE OF INSULIN: ICD-10-CM

## 2025-03-04 DIAGNOSIS — E11.65 TYPE 2 DIABETES MELLITUS WITH HYPERGLYCEMIA, WITH LONG-TERM CURRENT USE OF INSULIN: ICD-10-CM

## 2025-03-04 LAB — HBA1C MFR BLD: 8.9 % (ref 4.8–5.6)

## 2025-03-04 PROCEDURE — 36415 COLL VENOUS BLD VENIPUNCTURE: CPT

## 2025-03-04 PROCEDURE — 83036 HEMOGLOBIN GLYCOSYLATED A1C: CPT

## 2025-03-05 ENCOUNTER — OFFICE VISIT (OUTPATIENT)
Dept: FAMILY MEDICINE CLINIC | Facility: CLINIC | Age: 65
End: 2025-03-05
Payer: COMMERCIAL

## 2025-03-05 ENCOUNTER — HOSPITAL ENCOUNTER (OUTPATIENT)
Dept: GENERAL RADIOLOGY | Facility: HOSPITAL | Age: 65
Discharge: HOME | End: 2025-03-05
Admitting: NURSE PRACTITIONER
Payer: COMMERCIAL

## 2025-03-05 VITALS
SYSTOLIC BLOOD PRESSURE: 155 MMHG | BODY MASS INDEX: 38.15 KG/M2 | DIASTOLIC BLOOD PRESSURE: 66 MMHG | HEART RATE: 59 BPM | WEIGHT: 229 LBS | OXYGEN SATURATION: 97 % | HEIGHT: 65 IN

## 2025-03-05 DIAGNOSIS — M79.644 PAIN OF FINGER OF RIGHT HAND: ICD-10-CM

## 2025-03-05 DIAGNOSIS — Z79.4 TYPE 2 DIABETES MELLITUS WITH HYPERGLYCEMIA, WITH LONG-TERM CURRENT USE OF INSULIN: Primary | ICD-10-CM

## 2025-03-05 DIAGNOSIS — I10 HYPERTENSION, UNSPECIFIED TYPE: ICD-10-CM

## 2025-03-05 DIAGNOSIS — L03.011 PARONYCHIA OF FINGER OF RIGHT HAND: ICD-10-CM

## 2025-03-05 DIAGNOSIS — E11.65 TYPE 2 DIABETES MELLITUS WITH HYPERGLYCEMIA, WITH LONG-TERM CURRENT USE OF INSULIN: Primary | ICD-10-CM

## 2025-03-05 PROCEDURE — 99214 OFFICE O/P EST MOD 30 MIN: CPT | Performed by: NURSE PRACTITIONER

## 2025-03-05 PROCEDURE — 73140 X-RAY EXAM OF FINGER(S): CPT

## 2025-03-05 RX ORDER — MUPIROCIN CALCIUM 20 MG/G
1 CREAM TOPICAL 3 TIMES DAILY
Qty: 21 G | Refills: 0 | Status: SHIPPED | OUTPATIENT
Start: 2025-03-05 | End: 2025-03-12

## 2025-03-05 RX ORDER — AMLODIPINE AND VALSARTAN 10; 320 MG/1; MG/1
1 TABLET ORAL DAILY
Qty: 90 TABLET | Refills: 1 | Status: SHIPPED | OUTPATIENT
Start: 2025-03-05

## 2025-03-05 NOTE — ASSESSMENT & PLAN NOTE
Despite patient assuring me that he is still taking his metformin regularly and has been taking the Ozempic as directed, A1c has worsened, now at 8.9%.  After discussing with patient he admits that he has been eating poorly, discussed importance of following diabetic diet, we are increasing Ozempic to 1 mg daily, we will follow-up in 2 months and patient will have A1c checked today before his appointment so that we can discuss results and next steps.

## 2025-03-05 NOTE — ASSESSMENT & PLAN NOTE
Discussed with patient blood pressure appears to be staying chronically elevated above goal, after discussion we have decided to increase dose of Exforge to 10/320 mg, he will continue current dose of bisoprolol.  We will follow-up in 2 months for reevaluation, patient to monitor blood pressure at home

## 2025-03-05 NOTE — PROGRESS NOTES
Chief Complaint  Diabetes and Hypertension    SUBJECTIVE  Said Tyler presents to [facility] for three month follow up on Diabetes and Hypertension.     HTN: does not check his BP at home. 155/66 today in office. Pt states his nephrologist recommended him stop the HCTZ about 6 months ago. He also stopped the Flomax.     DM:  A1C has gone up since last check, pt states he has been taking his Ozempic weekly and Metformin 500 mg two dabs a day every day. No missed doses.     Pt states started infection after pulling a hang nail   History of Present Illness  Past Medical History:   Diagnosis Date    Diabetes mellitus     GERD (gastroesophageal reflux disease)     Hyperlipidemia     Hypertension     Hyperthyroidism     Kidney stone       Family History   Problem Relation Age of Onset    Diabetes Mother     Hyperlipidemia Mother       Past Surgical History:   Procedure Laterality Date    INNER EAR SURGERY Left     KIDNEY STONE SURGERY          Current Outpatient Medications:     allopurinol (ZYLOPRIM) 300 MG tablet, Take 1 tablet by mouth Daily., Disp: 90 tablet, Rfl: 1    atorvastatin (LIPITOR) 20 MG tablet, Take 1 tablet by mouth Daily., Disp: 90 tablet, Rfl: 1    bisoprolol (ZEBeta) 5 MG tablet, TAKE 1 TABLET BY MOUTH EVERY DAY, Disp: 90 tablet, Rfl: 1    glucose monitor monitoring kit, Glucometer, strips, and lancets to test TID PRN, Disp: 90 each, Rfl: 1    metFORMIN ER (GLUCOPHAGE-XR) 500 MG 24 hr tablet, TAKE 2 TABLETS BY MOUTH DAILY WITH BREAKFAST., Disp: 180 tablet, Rfl: 1    omeprazole (priLOSEC) 40 MG capsule, Take 1 capsule by mouth Daily., Disp: 90 capsule, Rfl: 1    Semaglutide,0.25 or 0.5MG/DOS, (OZEMPIC) 2 MG/1.5ML solution pen-injector, 0.25mg weekly for 4 weeks, then 0.5mg weekly thereafter., Disp: 4.5 mL, Rfl: 1    amLODIPine-valsartan (Exforge)  MG per tablet, Take 1 tablet by mouth Daily., Disp: 90 tablet, Rfl: 1    hydroCHLOROthiazide 12.5 MG tablet, Take 1  "tablet by mouth Daily. (Patient not taking: Reported on 3/5/2025), Disp: , Rfl:     insulin aspart (novoLOG FLEXPEN) 100 UNIT/ML solution pen-injector sc pen, Inject 20 Units under the skin into the appropriate area as directed 2 (Two) Times a Day With Meals for 180 days. Inject 10 units in the morning and 10 units in the evening., Disp: 12 mL, Rfl: 2    mupirocin (BACTROBAN) 2 % cream, Apply 1 Application topically to the appropriate area as directed 3 (Three) Times a Day for 7 days., Disp: 21 g, Rfl: 0    tamsulosin (FLOMAX) 0.4 MG capsule 24 hr capsule, Take 1 capsule by mouth Daily. (Patient not taking: Reported on 3/5/2025), Disp: , Rfl:     OBJECTIVE  Vital Signs:   /66   Pulse 59   Ht 165.1 cm (65\")   Wt 104 kg (229 lb)   SpO2 97%   BMI 38.11 kg/m²    Estimated body mass index is 38.11 kg/m² as calculated from the following:    Height as of this encounter: 165.1 cm (65\").    Weight as of this encounter: 104 kg (229 lb).     Wt Readings from Last 3 Encounters:   03/05/25 104 kg (229 lb)   02/21/25 103 kg (227 lb 11.2 oz)   12/05/24 104 kg (229 lb)     BP Readings from Last 3 Encounters:   03/05/25 155/66   02/21/25 153/87   12/05/24 140/60       Physical Exam  Vitals reviewed.   Constitutional:       Appearance: Normal appearance. He is well-developed.   HENT:      Head: Normocephalic and atraumatic.      Right Ear: External ear normal.      Left Ear: External ear normal.   Eyes:      Conjunctiva/sclera: Conjunctivae normal.      Pupils: Pupils are equal, round, and reactive to light.   Cardiovascular:      Rate and Rhythm: Normal rate and regular rhythm.      Heart sounds: No murmur heard.     No friction rub. No gallop.   Pulmonary:      Effort: Pulmonary effort is normal.      Breath sounds: Normal breath sounds. No wheezing or rhonchi.   Skin:     General: Skin is warm and dry.      Comments: Right hand third digit-patient noted to have some skin peeling around lateral aspect of nail, with area " of mild erythema, mildly tender to palpation, no drainage   Neurological:      Mental Status: He is alert and oriented to person, place, and time.      Cranial Nerves: No cranial nerve deficit.   Psychiatric:         Mood and Affect: Mood and affect normal.         Behavior: Behavior normal.         Thought Content: Thought content normal.         Judgment: Judgment normal.          Result Review    CMP          9/6/2024    08:39 9/9/2024    11:50   CMP   Glucose 176  204    BUN 33  31    Creatinine 1.96  2.07    EGFR 37.2  34.9    Sodium 138  137    Potassium 4.9  4.9    Chloride 103  102    Calcium 9.6  9.7    Total Protein 7.0     Albumin 4.2  4.2    Globulin 2.8     Total Bilirubin 0.4     Alkaline Phosphatase 71     AST (SGOT) 20     ALT (SGPT) 17     Albumin/Globulin Ratio 1.5     BUN/Creatinine Ratio 16.8  15.0    Anion Gap 14.9  16.1      CBC          9/6/2024    08:39 9/9/2024    11:50 12/5/2024    11:55   CBC   WBC 9.17  9.24  10.07    RBC 4.39  4.34  4.48    Hemoglobin 12.7  12.8  13.7    Hematocrit 39.5  38.5  39.8    MCV 90.0  88.7  88.8    MCH 28.9  29.5  30.6    MCHC 32.2  33.2  34.4    RDW 14.2  14.0  13.3    Platelets 212  210  228      Lipid Panel          9/6/2024    08:39   Lipid Panel   Total Cholesterol 161    Triglycerides 149    HDL Cholesterol 42    VLDL Cholesterol 26    LDL Cholesterol  93    LDL/HDL Ratio 2.12        Most Recent A1C          3/4/2025    09:51   HGBA1C Most Recent   Hemoglobin A1C 8.90        No Images in the past 120 days found..     The above data has been reviewed by ANETTE Galvan 03/05/2025 11:42 EST.          Patient Care Team:  Brandi Puente APRN as PCP - General (Nurse Practitioner)  Jayro Lopez MD as Consulting Physician (Nephrology)  Kvng Braswell MD as Consulting Physician (Cardiology)            ASSESSMENT & PLAN    Diagnoses and all orders for this visit:    1. Type 2 diabetes mellitus with hyperglycemia, with long-term current use  of insulin (Primary)  Assessment & Plan:  Despite patient assuring me that he is still taking his metformin regularly and has been taking the Ozempic as directed, A1c has worsened, now at 8.9%.  After discussing with patient he admits that he has been eating poorly, discussed importance of following diabetic diet, we are increasing Ozempic to 1 mg daily, we will follow-up in 2 months and patient will have A1c checked today before his appointment so that we can discuss results and next steps.      2. Paronychia of finger of right hand  Comments:  Discussed recommend warm Epsom salt soaks 20 minutes at a time 3 times daily, follow-up if no improvement  Orders:  -     XR Finger 2+ View Right; Future  -     mupirocin (BACTROBAN) 2 % cream; Apply 1 Application topically to the appropriate area as directed 3 (Three) Times a Day for 7 days.  Dispense: 21 g; Refill: 0    3. Pain of finger of right hand  -     XR Finger 2+ View Right; Future    4. Hypertension, unspecified type  Assessment & Plan:  Discussed with patient blood pressure appears to be staying chronically elevated above goal, after discussion we have decided to increase dose of Exforge to 10/320 mg, he will continue current dose of bisoprolol.  We will follow-up in 2 months for reevaluation, patient to monitor blood pressure at home    Orders:  -     amLODIPine-valsartan (Exforge)  MG per tablet; Take 1 tablet by mouth Daily.  Dispense: 90 tablet; Refill: 1         Tobacco Use: Low Risk  (3/5/2025)    Patient History     Smoking Tobacco Use: Never     Smokeless Tobacco Use: Never     Passive Exposure: Never       Follow Up     Return in about 2 months (around 5/5/2025), or if symptoms worsen or fail to improve.        Patient was given instructions and counseling regarding his condition or for health maintenance advice. Please see specific information pulled into the AVS if appropriate.   I have reviewed information obtained and documented by others and I  have confirmed the accuracy of this documented note.    Brandi Puente, APRN

## 2025-03-07 DIAGNOSIS — Z79.4 TYPE 2 DIABETES MELLITUS WITH HYPERGLYCEMIA, WITH LONG-TERM CURRENT USE OF INSULIN: ICD-10-CM

## 2025-03-07 DIAGNOSIS — E11.65 TYPE 2 DIABETES MELLITUS WITH HYPERGLYCEMIA, WITH LONG-TERM CURRENT USE OF INSULIN: ICD-10-CM

## 2025-03-10 RX ORDER — INSULIN ASPART 100 [IU]/ML
INJECTION, SOLUTION INTRAVENOUS; SUBCUTANEOUS
Qty: 15 ML | Refills: 2 | Status: SHIPPED | OUTPATIENT
Start: 2025-03-10

## 2025-03-10 RX ORDER — HYDROCHLOROTHIAZIDE 12.5 MG/1
12.5 TABLET ORAL DAILY PRN
Qty: 90 TABLET | Refills: 1 | OUTPATIENT
Start: 2025-03-10

## 2025-03-10 NOTE — TELEPHONE ENCOUNTER
Per last visit : Pt states his nephrologist recommended him stop the HCTZ about 6 months ago. He also stopped the Flomax

## 2025-03-18 ENCOUNTER — TRANSCRIBE ORDERS (OUTPATIENT)
Dept: LAB | Facility: HOSPITAL | Age: 65
End: 2025-03-18
Payer: COMMERCIAL

## 2025-03-18 ENCOUNTER — LAB (OUTPATIENT)
Dept: LAB | Facility: HOSPITAL | Age: 65
End: 2025-03-18
Payer: COMMERCIAL

## 2025-03-18 DIAGNOSIS — I12.9 HYPERTENSION, RENAL: ICD-10-CM

## 2025-03-18 DIAGNOSIS — E11.22 TYPE 2 DIABETES MELLITUS WITH DIABETIC CHRONIC KIDNEY DISEASE, UNSPECIFIED CKD STAGE, UNSPECIFIED WHETHER LONG TERM INSULIN USE: ICD-10-CM

## 2025-03-18 DIAGNOSIS — E87.20 ACIDEMIA: ICD-10-CM

## 2025-03-18 DIAGNOSIS — N18.4 CHRONIC RENAL DISEASE, STAGE IV: ICD-10-CM

## 2025-03-18 DIAGNOSIS — N40.0 ENLARGED PROSTATE: ICD-10-CM

## 2025-03-18 DIAGNOSIS — N18.4 CHRONIC RENAL DISEASE, STAGE IV: Primary | ICD-10-CM

## 2025-03-18 LAB
25(OH)D3 SERPL-MCNC: 13.8 NG/ML (ref 30–100)
ALBUMIN SERPL-MCNC: 3.8 G/DL (ref 3.5–5.2)
ALBUMIN UR-MCNC: 220.7 MG/DL
ANION GAP SERPL CALCULATED.3IONS-SCNC: 14.3 MMOL/L (ref 5–15)
BACTERIA UR QL AUTO: NORMAL /HPF
BILIRUB UR QL STRIP: NEGATIVE
BUN SERPL-MCNC: 36 MG/DL (ref 8–23)
BUN/CREAT SERPL: 14.9 (ref 7–25)
CALCIUM SPEC-SCNC: 9.9 MG/DL (ref 8.6–10.5)
CHLORIDE SERPL-SCNC: 103 MMOL/L (ref 98–107)
CLARITY UR: CLEAR
CO2 SERPL-SCNC: 19.7 MMOL/L (ref 22–29)
COLOR UR: YELLOW
CREAT SERPL-MCNC: 2.41 MG/DL (ref 0.76–1.27)
CREAT UR-MCNC: 100.6 MG/DL
CREAT UR-MCNC: 108.9 MG/DL
EGFRCR SERPLBLD CKD-EPI 2021: 28.9 ML/MIN/1.73
GLUCOSE SERPL-MCNC: 174 MG/DL (ref 65–99)
GLUCOSE UR STRIP-MCNC: NEGATIVE MG/DL
HBA1C MFR BLD: 8.2 % (ref 4.8–5.6)
HGB UR QL STRIP.AUTO: NEGATIVE
HYALINE CASTS UR QL AUTO: NORMAL /LPF
KETONES UR QL STRIP: NEGATIVE
LEUKOCYTE ESTERASE UR QL STRIP.AUTO: NEGATIVE
MICROALBUMIN/CREAT UR: 2193.8 MG/G (ref 0–29)
NITRITE UR QL STRIP: NEGATIVE
PH UR STRIP.AUTO: 6 [PH] (ref 5–8)
PHOSPHATE SERPL-MCNC: 3.7 MG/DL (ref 2.5–4.5)
POTASSIUM SERPL-SCNC: 4.8 MMOL/L (ref 3.5–5.2)
PROT ?TM UR-MCNC: 331.5 MG/DL
PROT UR QL STRIP: ABNORMAL
PROT/CREAT UR: 3.04 MG/G{CREAT}
PTH-INTACT SERPL-MCNC: 136 PG/ML (ref 15–65)
RBC # UR STRIP: NORMAL /HPF
REF LAB TEST METHOD: NORMAL
SODIUM SERPL-SCNC: 137 MMOL/L (ref 136–145)
SP GR UR STRIP: 1.02 (ref 1–1.03)
SQUAMOUS #/AREA URNS HPF: NORMAL /HPF
UROBILINOGEN UR QL STRIP: ABNORMAL
WBC # UR STRIP: NORMAL /HPF

## 2025-03-18 PROCEDURE — 82306 VITAMIN D 25 HYDROXY: CPT

## 2025-03-18 PROCEDURE — 36415 COLL VENOUS BLD VENIPUNCTURE: CPT

## 2025-03-18 PROCEDURE — 82570 ASSAY OF URINE CREATININE: CPT

## 2025-03-18 PROCEDURE — 81001 URINALYSIS AUTO W/SCOPE: CPT

## 2025-03-18 PROCEDURE — 84156 ASSAY OF PROTEIN URINE: CPT

## 2025-03-18 PROCEDURE — 82043 UR ALBUMIN QUANTITATIVE: CPT

## 2025-03-18 PROCEDURE — 83036 HEMOGLOBIN GLYCOSYLATED A1C: CPT

## 2025-03-18 PROCEDURE — 83970 ASSAY OF PARATHORMONE: CPT

## 2025-03-18 PROCEDURE — 80069 RENAL FUNCTION PANEL: CPT

## 2025-03-19 ENCOUNTER — TELEPHONE (OUTPATIENT)
Dept: FAMILY MEDICINE CLINIC | Facility: CLINIC | Age: 65
End: 2025-03-19
Payer: COMMERCIAL

## 2025-03-19 NOTE — TELEPHONE ENCOUNTER
Patient came into office stating insurance is needing a PA for the increase dose of Semaglutide, 1 MG/DOSE, (OZEMPIC) 2 MG/1.5ML solution pen-injector.

## 2025-04-08 ENCOUNTER — TELEPHONE (OUTPATIENT)
Dept: FAMILY MEDICINE CLINIC | Facility: CLINIC | Age: 65
End: 2025-04-08
Payer: COMMERCIAL

## 2025-04-08 DIAGNOSIS — M10.9 GOUT, UNSPECIFIED CAUSE, UNSPECIFIED CHRONICITY, UNSPECIFIED SITE: ICD-10-CM

## 2025-04-08 RX ORDER — PEN NEEDLE, DIABETIC 32 GX 1/4"
1 NEEDLE, DISPOSABLE MISCELLANEOUS 2 TIMES DAILY
Qty: 200 EACH | Refills: 1 | Status: SHIPPED | OUTPATIENT
Start: 2025-04-08

## 2025-04-08 RX ORDER — ALLOPURINOL 300 MG/1
300 TABLET ORAL DAILY
Qty: 90 TABLET | Refills: 0 | Status: SHIPPED | OUTPATIENT
Start: 2025-04-08

## 2025-04-08 NOTE — TELEPHONE ENCOUNTER
Called [facility] attempting to speak with someone regarding this however was kicked over to their voicemail. Ozempic 1 mg was sent 03/17/25. PA was processed and PA is not needed, I have personally contacted [facility] twice since then to notify them that no PA was needed and to dispense to pt. Left direct # for [facility] to return call as well.     Contacted pts daughter to let her know of the situation that its very important for him to pick this up. She will address this with him and contact [facility].

## 2025-04-08 NOTE — TELEPHONE ENCOUNTER
Patient came into the office stating that his pharmacy [facility] [address] has not received his prescription for Ozempic 1mg. He is also needing a prescription for sterile neddles 0.23 mm x 6mm 32 G x 1/4 requesting a call back

## 2025-04-25 DIAGNOSIS — E78.5 HYPERLIPIDEMIA, UNSPECIFIED HYPERLIPIDEMIA TYPE: ICD-10-CM

## 2025-04-25 RX ORDER — ATORVASTATIN CALCIUM 20 MG/1
20 TABLET, FILM COATED ORAL DAILY
Qty: 90 TABLET | Refills: 0 | Status: SHIPPED | OUTPATIENT
Start: 2025-04-25

## 2025-05-05 ENCOUNTER — OFFICE VISIT (OUTPATIENT)
Dept: FAMILY MEDICINE CLINIC | Facility: CLINIC | Age: 65
End: 2025-05-05
Payer: COMMERCIAL

## 2025-05-05 VITALS
OXYGEN SATURATION: 97 % | WEIGHT: 223 LBS | HEIGHT: 65 IN | HEART RATE: 71 BPM | DIASTOLIC BLOOD PRESSURE: 68 MMHG | BODY MASS INDEX: 37.15 KG/M2 | SYSTOLIC BLOOD PRESSURE: 136 MMHG

## 2025-05-05 DIAGNOSIS — E66.812 CLASS 2 SEVERE OBESITY DUE TO EXCESS CALORIES WITH SERIOUS COMORBIDITY AND BODY MASS INDEX (BMI) OF 37.0 TO 37.9 IN ADULT: ICD-10-CM

## 2025-05-05 DIAGNOSIS — E66.01 CLASS 2 SEVERE OBESITY DUE TO EXCESS CALORIES WITH SERIOUS COMORBIDITY AND BODY MASS INDEX (BMI) OF 37.0 TO 37.9 IN ADULT: ICD-10-CM

## 2025-05-05 DIAGNOSIS — Z79.4 TYPE 2 DIABETES MELLITUS WITH HYPERGLYCEMIA, WITH LONG-TERM CURRENT USE OF INSULIN: ICD-10-CM

## 2025-05-05 DIAGNOSIS — I10 HYPERTENSION, UNSPECIFIED TYPE: Primary | ICD-10-CM

## 2025-05-05 DIAGNOSIS — E11.65 TYPE 2 DIABETES MELLITUS WITH HYPERGLYCEMIA, WITH LONG-TERM CURRENT USE OF INSULIN: ICD-10-CM

## 2025-05-05 PROCEDURE — 99214 OFFICE O/P EST MOD 30 MIN: CPT | Performed by: NURSE PRACTITIONER

## 2025-05-05 NOTE — ASSESSMENT & PLAN NOTE
Patient has lost about 6 pounds, discussed need to continue to work for additional weight loss, patient will continue to work on diet and exercise changes

## 2025-05-05 NOTE — PROGRESS NOTES
Chief Complaint  Diabetes, Hypertension, Hyperlipidemia, Heartburn, and Gout    SUBJECTIVE  Said Tyler presents to [facility] for three month follow up on Diabetes, Hypertension, Hyperlipidemia, Heartburn, and Gout.     Pt states he has been taking 0.5 Ozempic due to insurance not covering the 1 mg, but he just got that over the weekend and will start it next week.  States he called his insurance to tell them that the pharmacy kept saying it was not approved even though we were saying that it was in his insurance was able to contact the pharmacy and got it straightened out    Patient states he has been taking the increased dose of Exforge.    History of Present Illness  Past Medical History:   Diagnosis Date    Diabetes mellitus     GERD (gastroesophageal reflux disease)     Hyperlipidemia     Hypertension     Hyperthyroidism     Kidney stone       Family History   Problem Relation Age of Onset    Diabetes Mother     Hyperlipidemia Mother       Past Surgical History:   Procedure Laterality Date    INNER EAR SURGERY Left     KIDNEY STONE SURGERY          Current Outpatient Medications:     allopurinol (ZYLOPRIM) 300 MG tablet, TAKE 1 TABLET BY MOUTH EVERY DAY, Disp: 90 tablet, Rfl: 0    amLODIPine-valsartan (Exforge)  MG per tablet, Take 1 tablet by mouth Daily., Disp: 90 tablet, Rfl: 1    atorvastatin (LIPITOR) 20 MG tablet, TAKE 1 TABLET BY MOUTH EVERY DAY, Disp: 90 tablet, Rfl: 0    bisoprolol (ZEBeta) 5 MG tablet, TAKE 1 TABLET BY MOUTH EVERY DAY, Disp: 90 tablet, Rfl: 1    glucose monitor monitoring kit, Glucometer, strips, and lancets to test TID PRN, Disp: 90 each, Rfl: 1    Insulin Aspart FlexPen 100 UNIT/ML solution pen-injector, Inject 20 Units under the skin into the appropriate area as directed 2 (Two) Times a Day With Meals for 180 days. Inject 10 units in the morning and 10 units in the evening., Disp: 15 mL, Rfl: 2    Insulin Pen Needle (BD Pen Needle Micro  "U/F) 32G X 6 MM misc, Use 1 Needle 2 (Two) Times a Day., Disp: 200 each, Rfl: 1    metFORMIN ER (GLUCOPHAGE-XR) 500 MG 24 hr tablet, TAKE 2 TABLETS BY MOUTH DAILY WITH BREAKFAST., Disp: 180 tablet, Rfl: 1    omeprazole (priLOSEC) 40 MG capsule, Take 1 capsule by mouth Daily., Disp: 90 capsule, Rfl: 1    Semaglutide, 1 MG/DOSE, (OZEMPIC) 2 MG/1.5ML solution pen-injector, Inject 1 mg under the skin into the appropriate area as directed 1 (One) Time Per Week., Disp: 9 mL, Rfl: 1    hydroCHLOROthiazide 12.5 MG tablet, Take 1 tablet by mouth Daily. (Patient not taking: Reported on 5/5/2025), Disp: , Rfl:     tamsulosin (FLOMAX) 0.4 MG capsule 24 hr capsule, Take 1 capsule by mouth Daily. (Patient not taking: Reported on 5/5/2025), Disp: , Rfl:     OBJECTIVE  Vital Signs:   /68   Pulse 71   Ht 165.1 cm (65\")   Wt 101 kg (223 lb)   SpO2 97%   BMI 37.11 kg/m²    Estimated body mass index is 37.11 kg/m² as calculated from the following:    Height as of this encounter: 165.1 cm (65\").    Weight as of this encounter: 101 kg (223 lb).     Wt Readings from Last 3 Encounters:   05/05/25 101 kg (223 lb)   03/05/25 104 kg (229 lb)   02/21/25 103 kg (227 lb 11.2 oz)     BP Readings from Last 3 Encounters:   05/05/25 136/68   03/05/25 155/66   02/21/25 153/87       Physical Exam  Vitals reviewed.   Constitutional:       Appearance: Normal appearance. He is well-developed.   HENT:      Head: Normocephalic and atraumatic.      Right Ear: External ear normal.      Left Ear: External ear normal.   Eyes:      Conjunctiva/sclera: Conjunctivae normal.      Pupils: Pupils are equal, round, and reactive to light.   Cardiovascular:      Rate and Rhythm: Normal rate and regular rhythm.      Heart sounds: No murmur heard.     No friction rub. No gallop.   Pulmonary:      Effort: Pulmonary effort is normal.      Breath sounds: Normal breath sounds. No wheezing or rhonchi.   Skin:     General: Skin is warm and dry.   Neurological:      " Mental Status: He is alert and oriented to person, place, and time.      Cranial Nerves: No cranial nerve deficit.   Psychiatric:         Mood and Affect: Mood and affect normal.         Behavior: Behavior normal.         Thought Content: Thought content normal.         Judgment: Judgment normal.          Result Review    CMP          9/6/2024    08:39 9/9/2024    11:50 3/18/2025    13:43   CMP   Glucose 176  204  174    BUN 33  31  36    Creatinine 1.96  2.07  2.41    EGFR 37.2  34.9  28.9    Sodium 138  137  137    Potassium 4.9  4.9  4.8    Chloride 103  102  103    Calcium 9.6  9.7  9.9    Total Protein 7.0      Albumin 4.2  4.2  3.8    Globulin 2.8      Total Bilirubin 0.4      Alkaline Phosphatase 71      AST (SGOT) 20      ALT (SGPT) 17      Albumin/Globulin Ratio 1.5      BUN/Creatinine Ratio 16.8  15.0  14.9    Anion Gap 14.9  16.1  14.3      A1C Last 3 Results          12/13/2024    13:22 3/4/2025    09:51 3/18/2025    13:43   HGBA1C Last 3 Results   Hemoglobin A1C 8.30  8.90  8.20        XR Finger 2+ View Right  Result Date: 3/7/2025  Impression: Soft tissue swelling with no definite acute osseous abnormality Electronically Signed: Jayme Patton MD  3/7/2025 4:04 PM EST  Workstation ID: [id number]       The above data has been reviewed by ANETTE Galvan 05/05/2025 11:50 EDT.          Patient Care Team:  Brandi Puente APRN as PCP - General (Nurse Practitioner)  Jayro Lopez MD as Consulting Physician (Nephrology)  Kvng Braswell MD as Consulting Physician (Cardiology)            ASSESSMENT & PLAN    Diagnoses and all orders for this visit:    1. Hypertension, unspecified type (Primary)  Assessment & Plan:  Blood pressure is much improved today on increased dose of Exforge, patient will continue 10/320 mg daily and we will follow-up again in 3 months.  He also has upcoming appointment with cardiology      2. Type 2 diabetes mellitus with hyperglycemia, with long-term current use of  insulin  Assessment & Plan:  Most recent A1c at 8.2%, improving from 8.9%.  Patient has finally gotten the 1 mg of Ozempic approved and picked up, he will start this at his next injection.  We will follow-up in 3 months, he will continue to work on diet and exercise changes, and will continue his metformin.      3. Class 2 severe obesity due to excess calories with serious comorbidity and body mass index (BMI) of 37.0 to 37.9 in adult  Assessment & Plan:  Patient has lost about 6 pounds, discussed need to continue to work for additional weight loss, patient will continue to work on diet and exercise changes           Tobacco Use: Low Risk  (5/5/2025)    Patient History     Smoking Tobacco Use: Never     Smokeless Tobacco Use: Never     Passive Exposure: Never       Follow Up     Return in about 3 months (around 8/5/2025), or if symptoms worsen or fail to improve.        Patient was given instructions and counseling regarding his condition or for health maintenance advice. Please see specific information pulled into the AVS if appropriate.   I have reviewed information obtained and documented by others and I have confirmed the accuracy of this documented note.    ANETTE Galvan

## 2025-05-05 NOTE — ASSESSMENT & PLAN NOTE
Blood pressure is much improved today on increased dose of Exforge, patient will continue 10/320 mg daily and we will follow-up again in 3 months.  He also has upcoming appointment with cardiology

## 2025-05-05 NOTE — ASSESSMENT & PLAN NOTE
Most recent A1c at 8.2%, improving from 8.9%.  Patient has finally gotten the 1 mg of Ozempic approved and picked up, he will start this at his next injection.  We will follow-up in 3 months, he will continue to work on diet and exercise changes, and will continue his metformin.

## 2025-05-19 DIAGNOSIS — I10 HYPERTENSION, UNSPECIFIED TYPE: ICD-10-CM

## 2025-05-19 RX ORDER — BISOPROLOL FUMARATE 5 MG/1
5 TABLET, FILM COATED ORAL DAILY
Qty: 90 TABLET | Refills: 1 | Status: SHIPPED | OUTPATIENT
Start: 2025-05-19

## 2025-05-21 ENCOUNTER — OFFICE VISIT (OUTPATIENT)
Dept: CARDIOLOGY | Facility: CLINIC | Age: 65
End: 2025-05-21
Payer: COMMERCIAL

## 2025-05-21 VITALS
BODY MASS INDEX: 36.49 KG/M2 | SYSTOLIC BLOOD PRESSURE: 136 MMHG | HEIGHT: 65 IN | DIASTOLIC BLOOD PRESSURE: 74 MMHG | HEART RATE: 75 BPM | WEIGHT: 219 LBS

## 2025-05-21 DIAGNOSIS — I25.10 CORONARY ARTERY DISEASE INVOLVING NATIVE CORONARY ARTERY OF NATIVE HEART WITHOUT ANGINA PECTORIS: ICD-10-CM

## 2025-05-21 DIAGNOSIS — E78.2 MIXED HYPERLIPIDEMIA: ICD-10-CM

## 2025-05-21 DIAGNOSIS — I51.89 DIASTOLIC DYSFUNCTION: ICD-10-CM

## 2025-05-21 DIAGNOSIS — I10 PRIMARY HYPERTENSION: Primary | ICD-10-CM

## 2025-05-21 NOTE — PROGRESS NOTES
Chief Complaint  Follow-up    Subjective        History of Present Illness  Said Tyler presents to [facility]   Mr. Scott is a 64-year-old male patient coming in today for routine follow-up for coronary artery disease and hypertension.  He is doing well, has no new concerns at visit today specifically he has no complaints of chest pains or major shortness of breath.  Tolerating medication regimens without any issues.      Past Medical History:   Diagnosis Date    Diabetes mellitus     GERD (gastroesophageal reflux disease)     Hyperlipidemia     Hypertension     Hyperthyroidism     Kidney stone        No Known Allergies     Past Surgical History:   Procedure Laterality Date    INNER EAR SURGERY Left     KIDNEY STONE SURGERY          Social History  He  reports that he has never smoked. He has never been exposed to tobacco smoke. He has never used smokeless tobacco. He reports that he does not drink alcohol and does not use drugs.    Family History  His family history includes Diabetes in his mother; Hyperlipidemia in his mother.       Current Outpatient Medications on File Prior to Visit   Medication Sig    allopurinol (ZYLOPRIM) 300 MG tablet TAKE 1 TABLET BY MOUTH EVERY DAY    amLODIPine-valsartan (Exforge)  MG per tablet Take 1 tablet by mouth Daily.    atorvastatin (LIPITOR) 20 MG tablet TAKE 1 TABLET BY MOUTH EVERY DAY    bisoprolol (ZEBeta) 5 MG tablet TAKE 1 TABLET BY MOUTH EVERY DAY    glucose monitor monitoring kit Glucometer, strips, and lancets to test TID PRN    Insulin Aspart FlexPen 100 UNIT/ML solution pen-injector Inject 20 Units under the skin into the appropriate area as directed 2 (Two) Times a Day With Meals for 180 days. Inject 10 units in the morning and 10 units in the evening.    Insulin Pen Needle (BD Pen Needle Micro U/F) 32G X 6 MM misc Use 1 Needle 2 (Two) Times a Day.    metFORMIN ER (GLUCOPHAGE-XR) 500 MG 24 hr tablet TAKE 2 TABLETS BY MOUTH DAILY  "WITH BREAKFAST.    Semaglutide, 1 MG/DOSE, (OZEMPIC) 2 MG/1.5ML solution pen-injector Inject 1 mg under the skin into the appropriate area as directed 1 (One) Time Per Week.    hydroCHLOROthiazide 12.5 MG tablet Take 1 tablet by mouth Daily. (Patient not taking: Reported on 3/5/2025)    tamsulosin (FLOMAX) 0.4 MG capsule 24 hr capsule Take 1 capsule by mouth Daily. (Patient not taking: Reported on 3/5/2025)     No current facility-administered medications on file prior to visit.         Review of Systems   Essentially negative.      Objective   Vitals:    05/21/25 0957   BP: 136/74   Pulse: 75   Weight: 99.3 kg (219 lb)   Height: 165.1 cm (65\")         Physical Exam  General : Alert, awake, no acute distress  Neck : Supple, no carotid bruit, no jugular venous distention  CVS : Regular rate and rhythm, no murmur, no rubs or gallops  Lungs: Clear to auscultation bilaterally, no crackles or rhonchi  Abdomen: Soft, nontender, bowel sounds active  Extremities: Warm, well-perfused, no pedal edema      Result Review     The following data was reviewed by Kristi Tucker, ANETTE  No results found for: \"PROBNP\"  CMP          9/6/2024    08:39 9/9/2024    11:50 3/18/2025    13:43   CMP   Glucose 176  204  174    BUN 33  31  36    Creatinine 1.96  2.07  2.41    EGFR 37.2  34.9  28.9    Sodium 138  137  137    Potassium 4.9  4.9  4.8    Chloride 103  102  103    Calcium 9.6  9.7  9.9    Total Protein 7.0      Albumin 4.2  4.2  3.8    Globulin 2.8      Total Bilirubin 0.4      Alkaline Phosphatase 71      AST (SGOT) 20      ALT (SGPT) 17      Albumin/Globulin Ratio 1.5      BUN/Creatinine Ratio 16.8  15.0  14.9    Anion Gap 14.9  16.1  14.3      CBC w/diff          9/6/2024    08:39 9/9/2024    11:50 12/5/2024    11:55   CBC w/Diff   WBC 9.17  9.24  10.07    RBC 4.39  4.34  4.48    Hemoglobin 12.7  12.8  13.7    Hematocrit 39.5  38.5  39.8    MCV 90.0  88.7  88.8    MCH 28.9  29.5  30.6    MCHC 32.2  33.2  34.4    RDW 14.2  14.0  " "13.3    Platelets 212  210  228    Neutrophil Rel % 56.8  60.9  61.2    Immature Granulocyte Rel % 1.3  1.1  1.3    Lymphocyte Rel % 29.4  26.9  25.2    Monocyte Rel % 7.2  6.5  7.2    Eosinophil Rel % 4.5  3.7  4.1    Basophil Rel % 0.8  0.9  1.0       Lab Results   Component Value Date    TSH 2.920 03/05/2024      No results found for: \"FREET4\"   No results found for: \"DDIMERQUANT\"  No results found for: \"MG\"   No results found for: \"DIGOXIN\"   No results found for: \"TROPONINT\"        Lipid Panel          9/6/2024    08:39   Lipid Panel   Total Cholesterol 161    Triglycerides 149    HDL Cholesterol 42    VLDL Cholesterol 26    LDL Cholesterol  93    LDL/HDL Ratio 2.12          Results for orders placed during the hospital encounter of 04/29/24    Adult Transthoracic Echo Complete W/ Cont if Necessary Per Protocol    Interpretation Summary    Left ventricular systolic function is normal. Calculated left ventricular EF = 69.5%    Left ventricular wall thickness is consistent with mild concentric hypertrophy.    Left ventricular diastolic function is consistent with (grade I) impaired relaxation.    The left atrial cavity is mildly dilated.    Results for orders placed during the hospital encounter of 04/29/24    Stress Test With Myocardial Perfusion One Day    Interpretation Summary    Myocardial perfusion imaging indicates a normal myocardial perfusion study with no evidence of ischemia.    Left ventricular ejection fraction is normal (Calculated EF = 50%).    There is a an small fixed perfusion defect in the inferior wall suggestive of attenuation. No clear evidence of ischemia was observed..    Findings consistent with a normal ECG stress test.           Assessment and Plan   Diagnoses and all orders for this visit:    1. Primary hypertension (Primary)    2. Diastolic dysfunction    3. Coronary artery disease involving native coronary artery of native heart without angina pectoris    4. Mixed " hyperlipidemia      Hypertension with diastolic dysfunction-blood pressures are well-controlled, he has no evidence of volume overload.  Continue current medication regiment.  CAD-he is stable without any symptoms of angina.  Hyperlipidemia-continue atorvastatin 20 mg nightly, would recommend goal LDL below 70.          Follow Up   Return in about 9 months (around 2/21/2026) for with Dr Mcpherson or Anette Gtz.    Patient was given instructions and counseling regarding his condition or for health maintenance advice. Please see specific information pulled into the AVS if appropriate.     Signed,  ANETTE Walton  05/21/2025     Dictated Utilizing Dragon Dictation: Please note that portions of this note were completed with a voice recognition program.  Part of this note may be an electronic transcription/translation of spoken language to printed text using the Dragon Dictation System.

## 2025-06-02 DIAGNOSIS — K21.9 GASTROESOPHAGEAL REFLUX DISEASE, UNSPECIFIED WHETHER ESOPHAGITIS PRESENT: ICD-10-CM

## 2025-06-02 RX ORDER — OMEPRAZOLE 40 MG/1
40 CAPSULE, DELAYED RELEASE ORAL DAILY
Qty: 90 CAPSULE | Refills: 1 | Status: SHIPPED | OUTPATIENT
Start: 2025-06-02

## 2025-06-20 DIAGNOSIS — E11.65 TYPE 2 DIABETES MELLITUS WITH HYPERGLYCEMIA, WITH LONG-TERM CURRENT USE OF INSULIN: ICD-10-CM

## 2025-06-20 DIAGNOSIS — I10 HYPERTENSION, UNSPECIFIED TYPE: ICD-10-CM

## 2025-06-20 DIAGNOSIS — Z79.4 TYPE 2 DIABETES MELLITUS WITH HYPERGLYCEMIA, WITH LONG-TERM CURRENT USE OF INSULIN: ICD-10-CM

## 2025-06-23 RX ORDER — INSULIN ASPART 100 [IU]/ML
20 INJECTION, SOLUTION INTRAVENOUS; SUBCUTANEOUS 2 TIMES DAILY
Qty: 15 ML | Refills: 2 | Status: SHIPPED | OUTPATIENT
Start: 2025-06-23

## 2025-06-23 RX ORDER — BISOPROLOL FUMARATE 5 MG/1
5 TABLET, FILM COATED ORAL DAILY
Qty: 90 TABLET | Refills: 1 | OUTPATIENT
Start: 2025-06-23

## 2025-06-30 ENCOUNTER — LAB (OUTPATIENT)
Dept: LAB | Facility: HOSPITAL | Age: 65
End: 2025-06-30
Payer: COMMERCIAL

## 2025-06-30 ENCOUNTER — RESULTS FOLLOW-UP (OUTPATIENT)
Dept: FAMILY MEDICINE CLINIC | Facility: CLINIC | Age: 65
End: 2025-06-30

## 2025-06-30 ENCOUNTER — OFFICE VISIT (OUTPATIENT)
Dept: FAMILY MEDICINE CLINIC | Facility: CLINIC | Age: 65
End: 2025-06-30
Payer: COMMERCIAL

## 2025-06-30 VITALS
HEIGHT: 65 IN | HEART RATE: 72 BPM | SYSTOLIC BLOOD PRESSURE: 143 MMHG | DIASTOLIC BLOOD PRESSURE: 74 MMHG | OXYGEN SATURATION: 98 % | BODY MASS INDEX: 35.82 KG/M2 | WEIGHT: 215 LBS

## 2025-06-30 DIAGNOSIS — K92.1 BLOOD IN STOOL: Primary | ICD-10-CM

## 2025-06-30 DIAGNOSIS — R19.8 ALTERNATING CONSTIPATION AND DIARRHEA: ICD-10-CM

## 2025-06-30 DIAGNOSIS — R10.30 LOWER ABDOMINAL PAIN: ICD-10-CM

## 2025-06-30 DIAGNOSIS — R42 DIZZINESS: ICD-10-CM

## 2025-06-30 LAB
027 TOXIN: NORMAL
ALBUMIN SERPL-MCNC: 4 G/DL (ref 3.5–5.2)
ALBUMIN/GLOB SERPL: 1.3 G/DL
ALP SERPL-CCNC: 80 U/L (ref 39–117)
ALT SERPL W P-5'-P-CCNC: 11 U/L (ref 1–41)
AMYLASE SERPL-CCNC: 74 U/L (ref 28–100)
ANION GAP SERPL CALCULATED.3IONS-SCNC: 13.9 MMOL/L (ref 5–15)
AST SERPL-CCNC: 16 U/L (ref 1–40)
BASOPHILS # BLD AUTO: 0.07 10*3/MM3 (ref 0–0.2)
BASOPHILS NFR BLD AUTO: 0.7 % (ref 0–1.5)
BILIRUB SERPL-MCNC: 0.3 MG/DL (ref 0–1.2)
BUN SERPL-MCNC: 43 MG/DL (ref 8–23)
BUN/CREAT SERPL: 13.4 (ref 7–25)
C DIFF TOX GENS STL QL NAA+PROBE: NEGATIVE
CALCIUM SPEC-SCNC: 9.4 MG/DL (ref 8.6–10.5)
CHLORIDE SERPL-SCNC: 105 MMOL/L (ref 98–107)
CO2 SERPL-SCNC: 17.1 MMOL/L (ref 22–29)
CREAT SERPL-MCNC: 3.2 MG/DL (ref 0.76–1.27)
DEPRECATED RDW RBC AUTO: 46.7 FL (ref 37–54)
EGFRCR SERPLBLD CKD-EPI 2021: 20.6 ML/MIN/1.73
EOSINOPHIL # BLD AUTO: 0.3 10*3/MM3 (ref 0–0.4)
EOSINOPHIL NFR BLD AUTO: 2.9 % (ref 0.3–6.2)
ERYTHROCYTE [DISTWIDTH] IN BLOOD BY AUTOMATED COUNT: 14 % (ref 12.3–15.4)
GLOBULIN UR ELPH-MCNC: 3 GM/DL
GLUCOSE SERPL-MCNC: 144 MG/DL (ref 65–99)
HCT VFR BLD AUTO: 38.4 % (ref 37.5–51)
HGB BLD-MCNC: 12.8 G/DL (ref 13–17.7)
IMM GRANULOCYTES # BLD AUTO: 0.06 10*3/MM3 (ref 0–0.05)
IMM GRANULOCYTES NFR BLD AUTO: 0.6 % (ref 0–0.5)
LYMPHOCYTES # BLD AUTO: 2.53 10*3/MM3 (ref 0.7–3.1)
LYMPHOCYTES NFR BLD AUTO: 24.4 % (ref 19.6–45.3)
MCH RBC QN AUTO: 30.6 PG (ref 26.6–33)
MCHC RBC AUTO-ENTMCNC: 33.3 G/DL (ref 31.5–35.7)
MCV RBC AUTO: 91.9 FL (ref 79–97)
MONOCYTES # BLD AUTO: 0.69 10*3/MM3 (ref 0.1–0.9)
MONOCYTES NFR BLD AUTO: 6.7 % (ref 5–12)
NEUTROPHILS NFR BLD AUTO: 6.7 10*3/MM3 (ref 1.7–7)
NEUTROPHILS NFR BLD AUTO: 64.7 % (ref 42.7–76)
NRBC BLD AUTO-RTO: 0 /100 WBC (ref 0–0.2)
PLATELET # BLD AUTO: 219 10*3/MM3 (ref 140–450)
PMV BLD AUTO: 12.3 FL (ref 6–12)
POTASSIUM SERPL-SCNC: 5 MMOL/L (ref 3.5–5.2)
PROT SERPL-MCNC: 7 G/DL (ref 6–8.5)
RBC # BLD AUTO: 4.18 10*6/MM3 (ref 4.14–5.8)
SODIUM SERPL-SCNC: 136 MMOL/L (ref 136–145)
T4 FREE SERPL-MCNC: 1.09 NG/DL (ref 0.92–1.68)
TSH SERPL DL<=0.05 MIU/L-ACNC: 2.79 UIU/ML (ref 0.27–4.2)
WBC NRBC COR # BLD AUTO: 10.35 10*3/MM3 (ref 3.4–10.8)

## 2025-06-30 PROCEDURE — 80053 COMPREHEN METABOLIC PANEL: CPT

## 2025-06-30 PROCEDURE — 87505 NFCT AGENT DETECTION GI: CPT

## 2025-06-30 PROCEDURE — 84443 ASSAY THYROID STIM HORMONE: CPT

## 2025-06-30 PROCEDURE — 99214 OFFICE O/P EST MOD 30 MIN: CPT | Performed by: NURSE PRACTITIONER

## 2025-06-30 PROCEDURE — 85025 COMPLETE CBC W/AUTO DIFF WBC: CPT

## 2025-06-30 PROCEDURE — 36415 COLL VENOUS BLD VENIPUNCTURE: CPT

## 2025-06-30 PROCEDURE — 87506 IADNA-DNA/RNA PROBE TQ 6-11: CPT

## 2025-06-30 PROCEDURE — 82150 ASSAY OF AMYLASE: CPT

## 2025-06-30 PROCEDURE — 87493 C DIFF AMPLIFIED PROBE: CPT

## 2025-06-30 PROCEDURE — 84439 ASSAY OF FREE THYROXINE: CPT

## 2025-06-30 NOTE — ASSESSMENT & PLAN NOTE
Discussed with patient multiple possible causes, given that his blood pressure remains on the higher end of normal and less likely to believe that low blood pressure is a contributing factor, discussed it could be related to dehydration, would like for patient to try to remain well-hydrated, we will start by checking labs.

## 2025-06-30 NOTE — PROGRESS NOTES
"Chief Complaint  Black or Bloody Stool, Bloated, Abdominal Pain, and Dizziness    SUBJECTIVE  Said Tyler presents to [facility] due to bloody stool, alternating constipation and diarrhea, abdominal discomfort, and feeling gassy/bloated for one week. Pt has tried Gas X with no relief.     Patient reports that he has been having alternating diarrhea and constipation.  He feels that the diarrhea is related to his metformin and he sometimes skips the metformin to\" give himself a break\" but then will often have hard dry stool.  For the last week he has been having alternating diarrhea as well as some blood with bowel movements.  The blood is not every time, and it does not appear to be in or on the stool but it is in the commode and he can see it turning the water red.  Patient states that his stool is typically a [place] chart #5 but does alternate between pure liquid at times and hard dry stool at times    Pt also reports feeling dizzy when he stands up, this has been occurring off and on for several months.  States that it is only when he has been bent over doing something, such as working in the garden, then stands up.  States the dizziness lasts about a minute or 2.  He sits down and rests and it resolves, he does not have dizziness outside of this.     History of Present Illness  Past Medical History:   Diagnosis Date    Diabetes mellitus     GERD (gastroesophageal reflux disease)     Hyperlipidemia     Hypertension     Hyperthyroidism     Kidney stone       Family History   Problem Relation Age of Onset    Diabetes Mother     Hyperlipidemia Mother       Past Surgical History:   Procedure Laterality Date    INNER EAR SURGERY Left     KIDNEY STONE SURGERY          Current Outpatient Medications:     allopurinol (ZYLOPRIM) 300 MG tablet, TAKE 1 TABLET BY MOUTH EVERY DAY, Disp: 90 tablet, Rfl: 0    amLODIPine-valsartan (Exforge)  MG per tablet, Take 1 tablet by mouth Daily., " "Disp: 90 tablet, Rfl: 1    atorvastatin (LIPITOR) 20 MG tablet, TAKE 1 TABLET BY MOUTH EVERY DAY, Disp: 90 tablet, Rfl: 0    bisoprolol (ZEBeta) 5 MG tablet, TAKE 1 TABLET BY MOUTH EVERY DAY, Disp: 90 tablet, Rfl: 1    glucose monitor monitoring kit, Glucometer, strips, and lancets to test TID PRN, Disp: 90 each, Rfl: 1    Insulin Pen Needle (BD Pen Needle Micro U/F) 32G X 6 MM misc, Use 1 Needle 2 (Two) Times a Day., Disp: 200 each, Rfl: 1    metFORMIN ER (GLUCOPHAGE-XR) 500 MG 24 hr tablet, TAKE 2 TABLETS BY MOUTH DAILY WITH BREAKFAST., Disp: 180 tablet, Rfl: 1    omeprazole (priLOSEC) 40 MG capsule, TAKE 1 CAPSULE BY MOUTH DAILY, Disp: 90 capsule, Rfl: 1    Semaglutide, 1 MG/DOSE, (OZEMPIC) 2 MG/1.5ML solution pen-injector, Inject 1 mg under the skin into the appropriate area as directed 1 (One) Time Per Week., Disp: 9 mL, Rfl: 1    Insulin Aspart FlexPen 100 UNIT/ML solution pen-injector, Inject 20 Units under the skin into the appropriate area as directed 2 (Two) Times a Day., Disp: 15 mL, Rfl: 2    OBJECTIVE  Vital Signs:   /74   Pulse 72   Ht 165.1 cm (65\")   Wt 97.5 kg (215 lb)   SpO2 98%   BMI 35.78 kg/m²    Estimated body mass index is 35.78 kg/m² as calculated from the following:    Height as of this encounter: 165.1 cm (65\").    Weight as of this encounter: 97.5 kg (215 lb).     Wt Readings from Last 3 Encounters:   06/30/25 97.5 kg (215 lb)   05/21/25 99.3 kg (219 lb)   05/05/25 101 kg (223 lb)     BP Readings from Last 3 Encounters:   06/30/25 143/74   05/21/25 136/74   05/05/25 136/68       Physical Exam  Vitals reviewed.   Constitutional:       Appearance: Normal appearance. He is well-developed.   HENT:      Head: Normocephalic and atraumatic.      Right Ear: External ear normal.      Left Ear: External ear normal.   Eyes:      Conjunctiva/sclera: Conjunctivae normal.      Pupils: Pupils are equal, round, and reactive to light.   Cardiovascular:      Rate and Rhythm: Normal rate and " regular rhythm.      Heart sounds: No murmur heard.     No friction rub. No gallop.   Pulmonary:      Effort: Pulmonary effort is normal.      Breath sounds: Normal breath sounds. No wheezing or rhonchi.   Abdominal:      General: Bowel sounds are normal.      Palpations: Abdomen is soft. There is no mass.      Tenderness: There is no abdominal tenderness. There is no guarding.   Skin:     General: Skin is warm and dry.   Neurological:      Mental Status: He is alert and oriented to person, place, and time.      Cranial Nerves: No cranial nerve deficit.   Psychiatric:         Mood and Affect: Mood and affect normal.         Behavior: Behavior normal.         Thought Content: Thought content normal.         Judgment: Judgment normal.          Result Review    CMP          9/6/2024    08:39 9/9/2024    11:50 3/18/2025    13:43   CMP   Glucose 176  204  174    BUN 33  31  36    Creatinine 1.96  2.07  2.41    EGFR 37.2  34.9  28.9    Sodium 138  137  137    Potassium 4.9  4.9  4.8    Chloride 103  102  103    Calcium 9.6  9.7  9.9    Total Protein 7.0      Albumin 4.2  4.2  3.8    Globulin 2.8      Total Bilirubin 0.4      Alkaline Phosphatase 71      AST (SGOT) 20      ALT (SGPT) 17      Albumin/Globulin Ratio 1.5      BUN/Creatinine Ratio 16.8  15.0  14.9    Anion Gap 14.9  16.1  14.3      CBC          9/6/2024    08:39 9/9/2024    11:50 12/5/2024    11:55   CBC   WBC 9.17  9.24  10.07    RBC 4.39  4.34  4.48    Hemoglobin 12.7  12.8  13.7    Hematocrit 39.5  38.5  39.8    MCV 90.0  88.7  88.8    MCH 28.9  29.5  30.6    MCHC 32.2  33.2  34.4    RDW 14.2  14.0  13.3    Platelets 212  210  228      Lipid Panel          9/6/2024    08:39   Lipid Panel   Total Cholesterol 161    Triglycerides 149    HDL Cholesterol 42    VLDL Cholesterol 26    LDL Cholesterol  93    LDL/HDL Ratio 2.12        Most Recent A1C          3/18/2025    13:43   HGBA1C Most Recent   Hemoglobin A1C 8.20        XR Finger 2+ View Right  Result Date:  3/7/2025  Impression: Soft tissue swelling with no definite acute osseous abnormality Electronically Signed: Jayme Patton MD  3/7/2025 4:04 PM EST  Workstation ID: [id number]       The above data has been reviewed by ANETTE Galvan 06/30/2025 09:43 EDT.          Patient Care Team:  Brandi Puente APRN as PCP - General (Nurse Practitioner)  Jayro Lopez MD as Consulting Physician (Nephrology)  Kvng Braswell MD as Consulting Physician (Cardiology)            ASSESSMENT & PLAN    Diagnoses and all orders for this visit:    1. Blood in stool (Primary)  -     Ambulatory Referral to Gastroenterology    2. Lower abdominal pain  -     Comprehensive metabolic panel; Future  -     CBC and differential; Future  -     Clostridioides difficile Toxin, PCR - Stool, Per Rectum; Future  -     Enteric Bacterial Panel - Stool, Per Rectum; Future  -     Enteric Parasite Panel - Stool, Per Rectum; Future  -     Ambulatory Referral to Gastroenterology    3. Alternating constipation and diarrhea  -     Comprehensive metabolic panel; Future  -     CBC and differential; Future  -     Clostridioides difficile Toxin, PCR - Stool, Per Rectum; Future  -     Enteric Bacterial Panel - Stool, Per Rectum; Future  -     Enteric Parasite Panel - Stool, Per Rectum; Future  -     Ambulatory Referral to Gastroenterology  -     Amylase; Future    4. Dizziness  Assessment & Plan:  Discussed with patient multiple possible causes, given that his blood pressure remains on the higher end of normal and less likely to believe that low blood pressure is a contributing factor, discussed it could be related to dehydration, would like for patient to try to remain well-hydrated, we will start by checking labs.    Orders:  -     Comprehensive metabolic panel; Future  -     CBC and differential; Future  -     TSH+Free T4; Future       With patient if any new or worsening symptoms seek emergent reevaluation    Tobacco Use: Low Risk   (6/30/2025)    Patient History     Smoking Tobacco Use: Never     Smokeless Tobacco Use: Never     Passive Exposure: Never       Follow Up     Return in about 2 weeks (around 7/14/2025).        Patient was given instructions and counseling regarding his condition or for health maintenance advice. Please see specific information pulled into the AVS if appropriate.   I have reviewed information obtained and documented by others and I have confirmed the accuracy of this documented note.    ANETTE Galvan

## 2025-07-01 LAB
CRYPTOSP DNA STL QL NAA+NON-PROBE: NOT DETECTED
E HISTOLYT DNA STL QL NAA+NON-PROBE: NOT DETECTED
G LAMBLIA DNA STL QL NAA+NON-PROBE: NOT DETECTED

## 2025-07-02 LAB
C COLI+JEJ+UPSA DNA STL QL NAA+NON-PROBE: NOT DETECTED
EC STX1+STX2 GENES STL QL NAA+NON-PROBE: NOT DETECTED
S ENT+BONG DNA STL QL NAA+NON-PROBE: NOT DETECTED
SHIGELLA SP+EIEC IPAH ST NAA+NON-PROBE: NOT DETECTED

## 2025-07-04 DIAGNOSIS — M10.9 GOUT, UNSPECIFIED CAUSE, UNSPECIFIED CHRONICITY, UNSPECIFIED SITE: ICD-10-CM

## 2025-07-07 RX ORDER — ALLOPURINOL 300 MG/1
300 TABLET ORAL DAILY
Qty: 90 TABLET | Refills: 0 | Status: SHIPPED | OUTPATIENT
Start: 2025-07-07

## 2025-07-08 DIAGNOSIS — M10.9 GOUT, UNSPECIFIED CAUSE, UNSPECIFIED CHRONICITY, UNSPECIFIED SITE: ICD-10-CM

## 2025-07-09 RX ORDER — ALLOPURINOL 300 MG/1
300 TABLET ORAL DAILY
Qty: 90 TABLET | Refills: 0 | OUTPATIENT
Start: 2025-07-09

## 2025-07-09 NOTE — TELEPHONE ENCOUNTER
Outpatient Medication Detail    allopurinol (ZYLOPRIM) 300 MG tablet        Sig: TAKE 1 TABLET BY MOUTH EVERY DAY        Sent to pharmacy as: Allopurinol 300 MG Oral Tablet (ZYLOPRIM)        Class: Normal        Route: Oral        E-Prescribing Status: Receipt confirmed by pharmacy (7/7/2025  2:02 PM EDT)

## 2025-08-05 ENCOUNTER — OFFICE VISIT (OUTPATIENT)
Dept: FAMILY MEDICINE CLINIC | Facility: CLINIC | Age: 65
End: 2025-08-05
Payer: COMMERCIAL

## 2025-08-05 VITALS
HEART RATE: 69 BPM | BODY MASS INDEX: 35.32 KG/M2 | WEIGHT: 212 LBS | HEIGHT: 65 IN | OXYGEN SATURATION: 97 % | DIASTOLIC BLOOD PRESSURE: 69 MMHG | SYSTOLIC BLOOD PRESSURE: 138 MMHG

## 2025-08-05 DIAGNOSIS — Z12.5 PROSTATE CANCER SCREENING: Primary | ICD-10-CM

## 2025-08-05 DIAGNOSIS — Z79.4 TYPE 2 DIABETES MELLITUS WITH HYPERGLYCEMIA, WITH LONG-TERM CURRENT USE OF INSULIN: ICD-10-CM

## 2025-08-05 DIAGNOSIS — R10.30 LOWER ABDOMINAL PAIN: ICD-10-CM

## 2025-08-05 DIAGNOSIS — E78.5 HYPERLIPIDEMIA, UNSPECIFIED HYPERLIPIDEMIA TYPE: ICD-10-CM

## 2025-08-05 DIAGNOSIS — E55.9 VITAMIN D DEFICIENCY: ICD-10-CM

## 2025-08-05 DIAGNOSIS — E11.65 TYPE 2 DIABETES MELLITUS WITH HYPERGLYCEMIA, WITH LONG-TERM CURRENT USE OF INSULIN: ICD-10-CM

## 2025-08-05 RX ORDER — ATORVASTATIN CALCIUM 20 MG/1
20 TABLET, FILM COATED ORAL DAILY
Qty: 90 TABLET | Refills: 0 | Status: SHIPPED | OUTPATIENT
Start: 2025-08-05

## 2025-08-11 ENCOUNTER — TELEPHONE (OUTPATIENT)
Dept: FAMILY MEDICINE CLINIC | Facility: CLINIC | Age: 65
End: 2025-08-11
Payer: COMMERCIAL

## 2025-08-18 ENCOUNTER — LAB (OUTPATIENT)
Dept: LAB | Facility: HOSPITAL | Age: 65
End: 2025-08-18
Payer: COMMERCIAL

## 2025-08-18 DIAGNOSIS — Z12.5 PROSTATE CANCER SCREENING: ICD-10-CM

## 2025-08-18 DIAGNOSIS — E55.9 VITAMIN D DEFICIENCY: ICD-10-CM

## 2025-08-18 DIAGNOSIS — Z79.4 TYPE 2 DIABETES MELLITUS WITH HYPERGLYCEMIA, WITH LONG-TERM CURRENT USE OF INSULIN: ICD-10-CM

## 2025-08-18 DIAGNOSIS — E11.65 TYPE 2 DIABETES MELLITUS WITH HYPERGLYCEMIA, WITH LONG-TERM CURRENT USE OF INSULIN: ICD-10-CM

## 2025-08-18 LAB
25(OH)D3 SERPL-MCNC: 20 NG/ML (ref 30–100)
ALBUMIN SERPL-MCNC: 4.1 G/DL (ref 3.5–5.2)
ALBUMIN UR-MCNC: 117.2 MG/DL
ALBUMIN/GLOB SERPL: 1.3 G/DL
ALP SERPL-CCNC: 79 U/L (ref 39–117)
ALT SERPL W P-5'-P-CCNC: 14 U/L (ref 1–41)
ANION GAP SERPL CALCULATED.3IONS-SCNC: 13.5 MMOL/L (ref 5–15)
AST SERPL-CCNC: 16 U/L (ref 1–40)
BILIRUB SERPL-MCNC: 0.5 MG/DL (ref 0–1.2)
BUN SERPL-MCNC: 43 MG/DL (ref 8–23)
BUN/CREAT SERPL: 15.3 (ref 7–25)
CALCIUM SPEC-SCNC: 9.7 MG/DL (ref 8.6–10.5)
CHLORIDE SERPL-SCNC: 106 MMOL/L (ref 98–107)
CHOLEST SERPL-MCNC: 154 MG/DL (ref 0–200)
CO2 SERPL-SCNC: 16.5 MMOL/L (ref 22–29)
CREAT SERPL-MCNC: 2.81 MG/DL (ref 0.76–1.27)
CREAT UR-MCNC: 95.4 MG/DL
EGFRCR SERPLBLD CKD-EPI 2021: 24 ML/MIN/1.73
GLOBULIN UR ELPH-MCNC: 3.2 GM/DL
GLUCOSE SERPL-MCNC: 167 MG/DL (ref 65–99)
HBA1C MFR BLD: 7 % (ref 4.8–5.6)
HDLC SERPL-MCNC: 40 MG/DL (ref 40–60)
LDLC SERPL CALC-MCNC: 95 MG/DL (ref 0–100)
LDLC/HDLC SERPL: 2.33 {RATIO}
MICROALBUMIN/CREAT UR: 1228.5 MG/G (ref 0–29)
POTASSIUM SERPL-SCNC: 5.3 MMOL/L (ref 3.5–5.2)
PROT SERPL-MCNC: 7.3 G/DL (ref 6–8.5)
PSA SERPL-MCNC: 11.4 NG/ML (ref 0–4)
SODIUM SERPL-SCNC: 136 MMOL/L (ref 136–145)
TRIGL SERPL-MCNC: 104 MG/DL (ref 0–150)
URATE SERPL-MCNC: 5.5 MG/DL (ref 3.4–7)
VLDLC SERPL-MCNC: 19 MG/DL (ref 5–40)

## 2025-08-18 PROCEDURE — 84550 ASSAY OF BLOOD/URIC ACID: CPT

## 2025-08-18 PROCEDURE — 82570 ASSAY OF URINE CREATININE: CPT

## 2025-08-18 PROCEDURE — 80053 COMPREHEN METABOLIC PANEL: CPT

## 2025-08-18 PROCEDURE — 82043 UR ALBUMIN QUANTITATIVE: CPT

## 2025-08-18 PROCEDURE — 82306 VITAMIN D 25 HYDROXY: CPT

## 2025-08-18 PROCEDURE — 80061 LIPID PANEL: CPT

## 2025-08-18 PROCEDURE — G0103 PSA SCREENING: HCPCS

## 2025-08-18 PROCEDURE — 83036 HEMOGLOBIN GLYCOSYLATED A1C: CPT

## 2025-08-18 PROCEDURE — 36415 COLL VENOUS BLD VENIPUNCTURE: CPT

## 2025-08-20 DIAGNOSIS — E11.65 TYPE 2 DIABETES MELLITUS WITH HYPERGLYCEMIA, WITH LONG-TERM CURRENT USE OF INSULIN: ICD-10-CM

## 2025-08-20 DIAGNOSIS — I10 HYPERTENSION, UNSPECIFIED TYPE: ICD-10-CM

## 2025-08-20 DIAGNOSIS — Z79.4 TYPE 2 DIABETES MELLITUS WITH HYPERGLYCEMIA, WITH LONG-TERM CURRENT USE OF INSULIN: ICD-10-CM

## 2025-08-20 RX ORDER — AMLODIPINE AND VALSARTAN 10; 320 MG/1; MG/1
1 TABLET ORAL DAILY
Qty: 90 TABLET | Refills: 0 | Status: SHIPPED | OUTPATIENT
Start: 2025-08-20

## 2025-08-20 RX ORDER — METFORMIN HYDROCHLORIDE 500 MG/1
1000 TABLET, EXTENDED RELEASE ORAL
Qty: 180 TABLET | Refills: 1 | OUTPATIENT
Start: 2025-08-20